# Patient Record
Sex: MALE | Race: BLACK OR AFRICAN AMERICAN | NOT HISPANIC OR LATINO | Employment: FULL TIME | URBAN - METROPOLITAN AREA
[De-identification: names, ages, dates, MRNs, and addresses within clinical notes are randomized per-mention and may not be internally consistent; named-entity substitution may affect disease eponyms.]

---

## 2020-10-05 ENCOUNTER — OFFICE VISIT (OUTPATIENT)
Dept: FAMILY MEDICINE CLINIC | Facility: CLINIC | Age: 30
End: 2020-10-05

## 2020-10-05 ENCOUNTER — HOSPITAL ENCOUNTER (OUTPATIENT)
Dept: GENERAL RADIOLOGY | Facility: HOSPITAL | Age: 30
Discharge: HOME | End: 2020-10-05
Admitting: INTERNAL MEDICINE

## 2020-10-05 VITALS
WEIGHT: 315 LBS | OXYGEN SATURATION: 99 % | SYSTOLIC BLOOD PRESSURE: 144 MMHG | BODY MASS INDEX: 39.17 KG/M2 | HEART RATE: 100 BPM | DIASTOLIC BLOOD PRESSURE: 92 MMHG | HEIGHT: 75 IN

## 2020-10-05 DIAGNOSIS — R29.898 WEAKNESS OF RIGHT LOWER EXTREMITY: ICD-10-CM

## 2020-10-05 DIAGNOSIS — M54.41 ACUTE RIGHT-SIDED LOW BACK PAIN WITH RIGHT-SIDED SCIATICA: ICD-10-CM

## 2020-10-05 DIAGNOSIS — I10 ESSENTIAL HYPERTENSION: ICD-10-CM

## 2020-10-05 DIAGNOSIS — M54.41 ACUTE RIGHT-SIDED LOW BACK PAIN WITH RIGHT-SIDED SCIATICA: Primary | ICD-10-CM

## 2020-10-05 PROCEDURE — 99203 OFFICE O/P NEW LOW 30 MIN: CPT | Performed by: INTERNAL MEDICINE

## 2020-10-05 PROCEDURE — 72100 X-RAY EXAM L-S SPINE 2/3 VWS: CPT

## 2020-10-05 PROCEDURE — 96372 THER/PROPH/DIAG INJ SC/IM: CPT | Performed by: INTERNAL MEDICINE

## 2020-10-05 RX ORDER — CYCLOBENZAPRINE HCL 10 MG
10 TABLET ORAL 2 TIMES DAILY PRN
Qty: 20 TABLET | Refills: 0 | Status: SHIPPED | OUTPATIENT
Start: 2020-10-05 | End: 2020-10-15

## 2020-10-05 RX ORDER — PREDNISONE 20 MG/1
40 TABLET ORAL DAILY
Qty: 10 TABLET | Refills: 0 | Status: SHIPPED | OUTPATIENT
Start: 2020-10-05 | End: 2020-10-10

## 2020-10-05 RX ORDER — NAPROXEN SODIUM 550 MG/1
550 TABLET ORAL 2 TIMES DAILY WITH MEALS
COMMUNITY
End: 2020-10-05 | Stop reason: SDUPTHER

## 2020-10-05 RX ORDER — KETOROLAC TROMETHAMINE 30 MG/ML
30 INJECTION, SOLUTION INTRAMUSCULAR; INTRAVENOUS ONCE
Status: COMPLETED | OUTPATIENT
Start: 2020-10-05 | End: 2020-10-05

## 2020-10-05 RX ORDER — CYCLOBENZAPRINE HCL 5 MG
10 TABLET ORAL 3 TIMES DAILY PRN
COMMUNITY
End: 2020-10-05

## 2020-10-05 RX ORDER — NAPROXEN SODIUM 550 MG/1
550 TABLET ORAL 2 TIMES DAILY WITH MEALS
Qty: 20 TABLET | Refills: 0 | Status: SHIPPED | OUTPATIENT
Start: 2020-10-05 | End: 2020-10-13

## 2020-10-05 RX ORDER — FAMOTIDINE 10 MG
10 TABLET ORAL 2 TIMES DAILY
Qty: 20 TABLET | Refills: 0 | Status: SHIPPED | OUTPATIENT
Start: 2020-10-05 | End: 2020-10-15

## 2020-10-05 RX ORDER — KETOROLAC TROMETHAMINE 30 MG/ML
60 INJECTION, SOLUTION INTRAMUSCULAR; INTRAVENOUS ONCE
Status: DISCONTINUED | OUTPATIENT
Start: 2020-10-05 | End: 2020-10-05

## 2020-10-05 RX ADMIN — KETOROLAC TROMETHAMINE 30 MG: 30 INJECTION, SOLUTION INTRAMUSCULAR; INTRAVENOUS at 16:22

## 2020-10-05 RX ADMIN — KETOROLAC TROMETHAMINE 30 MG: 30 INJECTION, SOLUTION INTRAMUSCULAR; INTRAVENOUS at 16:19

## 2020-10-05 NOTE — PROGRESS NOTES
Chief Complaint   Patient presents with   • Establish Care   • Leg Pain   • Back Pain       HPI:  Mario Sanchez is a 30 y.o. male who presents today for acute onset right-sided back and hip pain with weakness in the right lower extremity.  Denies any loss of bowel or bladder function.  Reports the pain is a 10 out of 10.  He woke up with this pain approximately 10 days ago.  [facility] evaluated at  [facility] and was told to follow-up with his primary care.  He has a history of bilateral hip surgery at 11 years old.  He does have 2 pins in place.  He was given anti-inflammatories and muscle relaxer UK which are not effective for him.  His x-ray of hips was reportedly normal.    ROS:  Constitutional: no fevers, night sweats or unexplained weight loss  Eyes: no vision changes  ENT: no runny nose, ear pain, sore throat  Cardio: no chest pain, palpitations  Pulm: no shortness of breath, wheezing, or cough  GI: no abdominal pain or changes in bowel movements  : no difficulty urinating  MSK: no difficulty ambulating, no joint pain  Neuro: no weakness, dizziness or headache  Psych: no trouble sleeping  Endo: no change in appetite      History reviewed. No pertinent past medical history.   Family History   Family history unknown: Yes      Social History     Socioeconomic History   • Marital status: Single     Spouse name: Not on file   • Number of children: Not on file   • Years of education: Not on file   • Highest education level: Not on file   Tobacco Use   • Smoking status: Never Smoker   • Smokeless tobacco: Never Used   Substance and Sexual Activity   • Alcohol use: Never     Frequency: Never   • Drug use: Never      No Known Allergies     There is no immunization history on file for this patient.     PE:  Vitals:    10/05/20 1537   BP: 144/92   Pulse: 100   SpO2: 99%      Body mass index is 50.5 kg/m².    Gen Appearance: NAD  HEENT: Normocephalic, PERRLA, no thyromegaly, trache midline  Heart: RRR, normal S1 and S2, no  murmur  Lungs: CTA b/l, no wheezing, no crackles  Abdomen: Soft, non-tender, non-distended, no guarding and BSx4  MSK: Moves all extremities well, normal gait, no peripheral edema, 2 out of 5 strength right lower extremity, positive straight leg raise on the right  Pulses: Palpable and equal b/l  Lymph nodes: No palpable lymphadenopathy   Neuro: No focal deficits      Current Outpatient Medications   Medication Sig Dispense Refill   • HM LIDOCAINE PATCH EX Apply  topically.     • naproxen sodium (ANAPROX) 550 MG tablet Take 1 tablet by mouth 2 (Two) Times a Day With Meals for 10 days. 20 tablet 0   • cyclobenzaprine (FLEXERIL) 10 MG tablet Take 1 tablet by mouth 2 (Two) Times a Day As Needed for Muscle Spasms for up to 10 days. 20 tablet 0   • famotidine (Pepcid AC) 10 MG tablet Take 1 tablet by mouth 2 (Two) Times a Day for 10 days. 20 tablet 0   • predniSONE (DELTASONE) 20 MG tablet Take 2 tablets by mouth Daily for 5 days. 10 tablet 0     No current facility-administered medications for this visit.         Mario was seen today for establish care, leg pain and back pain.    Diagnoses and all orders for this visit:    Acute right-sided low back pain with right-sided sciatica  -     Discontinue: ketorolac (TORADOL) injection 60 mg  -     famotidine (Pepcid AC) 10 MG tablet; Take 1 tablet by mouth 2 (Two) Times a Day for 10 days.  -     XR Spine Lumbar 2 or 3 View; Future  -     ketorolac (TORADOL) injection 30 mg  -     ketorolac (TORADOL) injection 30 mg  -     Ambulatory Referral to Physical Therapy Evaluate and treat  Low back strain versus sciatica.  Toradol injection today.  Steroid burst with muscle relaxers.  Pepcid for GI protection.  Will need MRI with right-sided lower extremity weakness, worsening symptoms.  Will need to rule out disc pathology versus lumbar radiculopathy.  Weakness of right lower extremity  -     famotidine (Pepcid AC) 10 MG tablet; Take 1 tablet by mouth 2 (Two) Times a Day for 10  days.  -     XR Spine Lumbar 2 or 3 View; Future  See above.  Essential hypertension  Recheck in 6 weeks, likely elevated due to pain.  Other orders  -     naproxen sodium (ANAPROX) 550 MG tablet; Take 1 tablet by mouth 2 (Two) Times a Day With Meals for 10 days.  -     cyclobenzaprine (FLEXERIL) 10 MG tablet; Take 1 tablet by mouth 2 (Two) Times a Day As Needed for Muscle Spasms for up to 10 days.  -     predniSONE (DELTASONE) 20 MG tablet; Take 2 tablets by mouth Daily for 5 days.         Return in about 6 weeks (around 11/16/2020).     Please note that portions of this document were completed with a voice recognition program. Efforts were made to edit the dictations, but occasionally words are mis-transcribed.

## 2020-10-07 ENCOUNTER — TELEPHONE (OUTPATIENT)
Dept: FAMILY MEDICINE CLINIC | Facility: CLINIC | Age: 30
End: 2020-10-07

## 2020-10-07 NOTE — TELEPHONE ENCOUNTER
Pt had his xray and is waiting on a call for the MRI. What is he to do between now and then. The pt cannot walk, he is in severe pain.     Please call to advise

## 2020-10-07 NOTE — TELEPHONE ENCOUNTER
Contacted patient wife and relayed Dr. Marks message. She declined because she states he  has been to the ED recently and they were unable to manage his pain     I advised her that until the patient has his MRI completed the doctors will not know what is causing this or how to treat his pain. She verbalized understanding and agreed to call us if anything worsens

## 2020-10-12 ENCOUNTER — TELEPHONE (OUTPATIENT)
Dept: FAMILY MEDICINE CLINIC | Facility: CLINIC | Age: 30
End: 2020-10-12

## 2020-10-12 NOTE — TELEPHONE ENCOUNTER
PATIENT'S WIFE CALLED STATING THAT PATIENT IS ON HIS WAY HOME FROM WORK BECAUSE HE IS IN A LOT OF PAIN AND WANTED TO KNOW IF HE COULD BE EXCUSED FROM WORK STARTING TODAY UNTIL HIS HIS MRI ON Thursday.    ANY QUESTIONS CAN CALL [phone]. PLEASE CALL PATIENT WHEN AND IF THIS CAN BE DONE.    PATIENT'S WIFE ALSO WANTED TO KNOW IF THERE WAS SOMETHING ELSE TO GIVE HIM FOR PAIN. PATIENT IS JUST IN A LOT OF PAIN.    [facility] [address]

## 2020-10-13 DIAGNOSIS — M54.41 ACUTE RIGHT-SIDED LOW BACK PAIN WITH RIGHT-SIDED SCIATICA: Primary | ICD-10-CM

## 2020-10-13 RX ORDER — MELOXICAM 15 MG/1
15 TABLET ORAL DAILY
Qty: 14 TABLET | Refills: 0 | Status: SHIPPED | OUTPATIENT
Start: 2020-10-13 | End: 2020-10-17 | Stop reason: SDUPTHER

## 2020-10-13 RX ORDER — TRAMADOL HYDROCHLORIDE 50 MG/1
50 TABLET ORAL NIGHTLY PRN
Qty: 5 TABLET | Refills: 0 | Status: SHIPPED | OUTPATIENT
Start: 2020-10-13 | End: 2020-10-17 | Stop reason: SDUPTHER

## 2020-10-13 NOTE — TELEPHONE ENCOUNTER
Sent in 2 additional medications to pharmacy. D/c naproxen and start taking mobic once daily. Tramadol is to be taken at night for severe pain. Please let pt know.

## 2020-10-13 NOTE — TELEPHONE ENCOUNTER
Contacted patient's wife and alerted her that Dr. Gardner has written a letter excusing him from work. She has requesting the letter to be mailed to their home address. Letter has been sent today    She also states that the Toradol shot has not been very effective for him and he is willing to try something else.

## 2020-10-13 NOTE — TELEPHONE ENCOUNTER
I have a letter printed in my outbox if he would like to come pick it up. If the toradol shot was effective at the last visit he can receive another one of those or I can call in a different oral medication.

## 2020-10-14 NOTE — TELEPHONE ENCOUNTER
Contacted patient's wife, she stated the patient has received the pain medicine and it has helped tremendously.     She confirmed that the the MRI is scheduled for 10/15 @ 3pm and thanked us

## 2020-10-15 ENCOUNTER — HOSPITAL ENCOUNTER (OUTPATIENT)
Dept: MRI IMAGING | Facility: HOSPITAL | Age: 30
Discharge: HOME | End: 2020-10-15
Admitting: INTERNAL MEDICINE

## 2020-10-15 DIAGNOSIS — M54.41 ACUTE RIGHT-SIDED LOW BACK PAIN WITH RIGHT-SIDED SCIATICA: ICD-10-CM

## 2020-10-15 DIAGNOSIS — R29.898 WEAKNESS OF RIGHT LOWER EXTREMITY: ICD-10-CM

## 2020-10-15 PROCEDURE — 72148 MRI LUMBAR SPINE W/O DYE: CPT

## 2020-10-16 ENCOUNTER — TELEPHONE (OUTPATIENT)
Dept: FAMILY MEDICINE CLINIC | Facility: CLINIC | Age: 30
End: 2020-10-16

## 2020-10-16 DIAGNOSIS — M51.26 LUMBAR HERNIATED DISC: Primary | ICD-10-CM

## 2020-10-16 NOTE — TELEPHONE ENCOUNTER
----- Message from Vipin Gardner DO sent at 10/16/2020  2:24 PM EDT -----  Please let patient know he has multiple herniated disc on lumbar MRI.  Would not recommend working until he sees neurosurgery.  I have placed referral today.    If he needs another excuse for work please let me know.

## 2020-10-16 NOTE — TELEPHONE ENCOUNTER
READ PATIENT THE NOTE FROM DR ROWE. PATIENT UNDERSTOOD. HE IS REQUESTING A WORK NOTE STARTING 10/19/2020 AND ON.     IF DR ROWE WANTS HIM TO BE ON THE MEDS HE WILL NEED SOME CALLED IN.  TRAMADOL 50 MG, HAS 1 TAB LEFT  MELOXICAM 15 MG. HAS 9 TABS LEFT    [facility] [address]    PATIENT REQUESTING A CALL ABOUT THE MEDS

## 2020-10-17 DIAGNOSIS — M54.41 ACUTE RIGHT-SIDED LOW BACK PAIN WITH RIGHT-SIDED SCIATICA: ICD-10-CM

## 2020-10-17 RX ORDER — TRAMADOL HYDROCHLORIDE 50 MG/1
50 TABLET ORAL NIGHTLY PRN
Qty: 10 TABLET | Refills: 0 | Status: SHIPPED | OUTPATIENT
Start: 2020-10-17 | End: 2020-11-25

## 2020-10-17 RX ORDER — MELOXICAM 15 MG/1
15 TABLET ORAL DAILY
Qty: 14 TABLET | Refills: 0 | Status: SHIPPED | OUTPATIENT
Start: 2020-10-17

## 2020-10-19 ENCOUNTER — TELEPHONE (OUTPATIENT)
Dept: FAMILY MEDICINE CLINIC | Facility: CLINIC | Age: 30
End: 2020-10-19

## 2020-10-19 NOTE — TELEPHONE ENCOUNTER
GRZEGORZ BROUGHT IN A [employer] DISABILITY FORM  TO BE COMPLETED AND FAXED [phone].    PLACED IN DR ROWE'S FOLDER UP FRONT.    REQUESTING A CALL WHEN FAXED.

## 2020-10-19 NOTE — TELEPHONE ENCOUNTER
PATIENT GRZEGORZ WHITMAN (DEANDRE ON FILE) CALLING;     SAYS PATIENT CHASIDY SRINIVASAN HASN'T HEARD FROM THE REFERRAL DEPT FOR NEUROSURGEON.    ANTONETTE TO ALSO DROP OFF -MEDICAL DISABILITY PAPERWORK.     PATIENT WAS ADVISED TO STAY OFF WORK UNTIL HIS APPOINTMENT WITH THE NEUROSURGEON.     PATIENT ALSO NEEDS A RETURN TO WORK LETTER WITH EXTENDED DATES; AS THE ONE HE HAS EXPIRES TODAY AND NO APPT YET WITH NEUROSURGEON.     PLEASE CALL PATIENT AND ADVISE; PLEASE MAIL UPDATED RETURN TO WORK LETTER TO PATIENT.     PH: [phone]    PATIENT ANTONETTE NBR: [phone]

## 2020-10-19 NOTE — TELEPHONE ENCOUNTER
Attempted to contact, no answer. LVM advising patient that the letter excusing him from work has been placed in the mail.     His referral has been placed on 10/16 and is still being processed.

## 2020-10-19 NOTE — TELEPHONE ENCOUNTER
CHECKING ON THE STATUS OF THE NEURO REFERRAL. PATIENT IS IN PAIN AND REQUESTING WE GET HIM IN SOON.

## 2020-10-27 ENCOUNTER — TELEPHONE (OUTPATIENT)
Dept: FAMILY MEDICINE CLINIC | Facility: CLINIC | Age: 30
End: 2020-10-27

## 2020-10-27 NOTE — TELEPHONE ENCOUNTER
He cannot go to work but needs to money.  I am faxing our paperwork which was only 1 page.  Usually this has to be more but will fax what we have.    They will  a copy as well that is down stairs,.

## 2020-10-27 NOTE — TELEPHONE ENCOUNTER
Completed form is in my outbox. Does he want to return to work now? I would recommend neurosurgery eval on 11/4/20 first.  His last work excuse note is valid until 11/2/2020.

## 2020-10-27 NOTE — TELEPHONE ENCOUNTER
PATIENT'S ANTONETTE CALLED TO FOLLOW UP ON SHORT TERM DISABILITY PAPERWORK THAT WAS DROPPED OFF ON Monday OF LAST WEEK 10/19.  PATIENTS WORK IS STATING THEY HAVE NOT RECEIVED ANYTHING AND THE PATIENT IS REALLY CONCERNED BECAUSE HE WON'T GET PAID IF THEY DO NOT RECEIVE THIS PAPERWORK.      ALSO, NEEDS A RELEASE TO RETURN TO WORK.     PATIENT WOULD LIKE TO STOP AND  AT OFFICE.      PLEASE CALL AND ADVISE: [phone]

## 2020-10-27 NOTE — TELEPHONE ENCOUNTER
Pt viry is calling to check the status. He cannot get paid if this is not completed. She states that she will drop off new forms if she has too she just needs this to be taken care of asap. Please call and update pt/viry.

## 2020-10-28 NOTE — TELEPHONE ENCOUNTER
Attending physicians statement of disability form has been completed. Copy has been placed for scan and original is on first floor for patient .     LVM explaining this to patient and gave office # incase they had further questions.

## 2020-11-04 ENCOUNTER — OFFICE VISIT (OUTPATIENT)
Dept: NEUROSURGERY | Facility: CLINIC | Age: 30
End: 2020-11-04

## 2020-11-04 ENCOUNTER — DOCUMENTATION (OUTPATIENT)
Dept: NEUROSURGERY | Facility: CLINIC | Age: 30
End: 2020-11-04

## 2020-11-04 VITALS — WEIGHT: 315 LBS | TEMPERATURE: 97.7 F | HEIGHT: 75 IN | BODY MASS INDEX: 39.17 KG/M2

## 2020-11-04 DIAGNOSIS — M51.16 LUMBAR DISC HERNIATION WITH RADICULOPATHY: Primary | ICD-10-CM

## 2020-11-04 DIAGNOSIS — E66.01 MORBID OBESITY WITH BMI OF 50.0-59.9, ADULT (HCC): ICD-10-CM

## 2020-11-04 DIAGNOSIS — M51.36 DDD (DEGENERATIVE DISC DISEASE), LUMBAR: ICD-10-CM

## 2020-11-04 PROCEDURE — 99243 OFF/OP CNSLTJ NEW/EST LOW 30: CPT | Performed by: NEUROLOGICAL SURGERY

## 2020-11-04 RX ORDER — GABAPENTIN 300 MG/1
300 CAPSULE ORAL TAKE AS DIRECTED
Qty: 90 CAPSULE | Refills: 1 | OUTPATIENT
Start: 2020-11-04

## 2020-11-04 NOTE — PROGRESS NOTES
Patient: Mario Sanchez  : 1990    Primary Care Provider: Vipin Gardner DO    Requesting Provider: As above        History    Chief Complaint: Low back and right leg pain with sensory alteration.    History of Present Illness: Mr. Sanchez is a 30-year-old  who on 2020 began experiencing back pain that radiates into the right leg.  He has a burning or cramping on the side of the right leg.  He has symptoms in the back of the right calf.  He has burning in the ball of his right foot as well as some sensory alteration in the middle toes of the right foot.  He has no left leg symptoms.  He denies any inciting or precipitating events.  He has had some low-grade back pain in the past but not anything of significance.  He has had a steroid injection in his back at [facility] yesterday.  He has been treated with muscle relaxants.  He has no bowel or bladder dysfunction.  Symptoms are better when he lies on his stomach.  He is worse with sitting, standing, walking.     Review of Systems   Constitutional: Negative for activity change, appetite change, chills, diaphoresis, fatigue, fever and unexpected weight change.   HENT: Negative for congestion, dental problem, drooling, ear discharge, ear pain, facial swelling, hearing loss, mouth sores, nosebleeds, postnasal drip, rhinorrhea, sinus pressure, sinus pain, sneezing, sore throat, tinnitus, trouble swallowing and voice change.    Eyes: Negative for photophobia, pain, discharge, redness, itching and visual disturbance.   Respiratory: Negative for apnea, cough, choking, chest tightness, shortness of breath, wheezing and stridor.    Cardiovascular: Negative for chest pain, palpitations and leg swelling.   Gastrointestinal: Negative for abdominal distention, abdominal pain, anal bleeding, blood in stool, constipation, diarrhea, nausea, rectal pain and vomiting.   Endocrine: Negative for cold intolerance, heat intolerance,  "polydipsia, polyphagia and polyuria.   Genitourinary: Negative for decreased urine volume, difficulty urinating, discharge, dysuria, enuresis, flank pain, frequency, genital sores, hematuria, penile pain, penile swelling, scrotal swelling, testicular pain and urgency.   Musculoskeletal: Positive for back pain. Negative for arthralgias, gait problem, joint swelling, myalgias, neck pain and neck stiffness.   Skin: Negative for color change, pallor, rash and wound.   Allergic/Immunologic: Negative for environmental allergies, food allergies and immunocompromised state.   Neurological: Negative for dizziness, tremors, seizures, syncope, facial asymmetry, speech difficulty, weakness, light-headedness, numbness and headaches.   Hematological: Negative for adenopathy. Does not bruise/bleed easily.   Psychiatric/Behavioral: Negative for agitation, behavioral problems, confusion, decreased concentration, dysphoric mood, hallucinations, self-injury, sleep disturbance and suicidal ideas. The patient is not nervous/anxious and is not hyperactive.        The patient's past medical history, past surgical history, family history, and social history have been reviewed at length in the electronic medical record.    Physical Exam:   Temp 97.7 °F (36.5 °C)   Ht 190.5 cm (75\")   Wt (!) 185 kg (407 lb)   BMI 50.87 kg/m²   CONSTITUTIONAL: Patient is well-nourished, pleasant and appears stated age.  CV: Heart regular rate and rhythm without murmur, rub, or gallop.  PULMONARY: Lungs are clear to ascultation.  MUSCULOSKELETAL:  Straight leg raising is positive on the right at 30 degrees.  Ivan's Sign is negative.  ROM in back is mildly limited in all directions.  Tenderness in the back to palpation is not observed.  NEUROLOGICAL:  Orientation, memory, attention span, language function, and cognition have been examined and are intact.  Strength is intact in the lower extremities to direct testing.  Muscle tone is normal " throughout.  Station and gait are normal.  Sensation is intact to light touch testing throughout.  Deep tendon reflexes are 1+ and symmetrical except at the right ankle where the reflexes absent.  Coordination is intact.      Medical Decision Making    Data Review:   MRI of the lumbar spine dated 10/15/2020 demonstrates loss of signal within the disc at L4-5 and L5-S1.  Large central disc protrusions are noted at L4-5 and L5-S1.  Broad-based components afflicting both sides are noted at both levels.  There is some facet arthropathy.    Diagnosis:   1.  Right L5/S1 radiculopathy due to disc protrusions.  2.  Morbid obesity.  3.  Lumbar degenerative disc disease.  4.  Lumbar degenerative joint disease.    Treatment Options:   We will see how he responds to the epidural injection that was performed yesterday.  I have referred him to physical therapy and have prescribed Neurontin.  He is to be off work until follow-up in my clinic in 3 weeks.  If he continues to struggle then we will need to consider two-level lumbar discectomy.  I discussed the importance of weight loss as it relates to the long-term health of his back.       Diagnosis Plan   1. Lumbar disc herniation with radiculopathy  Ambulatory Referral to Physical Therapy Evaluate and treat; Stretching, ROM, Strengthening    gabapentin (NEURONTIN) 300 MG capsule   2. DDD (degenerative disc disease), lumbar     3. Morbid obesity with BMI of 50.0-59.9, adult (CMS/[facility])         Scribed for Mulugeta Jesus MD by Amie Oleary CMA on 11/4/2020 08:36 EST       I, Dr. Jesus, personally performed the services described in the documentation, as scribed in my presence, and it is both accurate and complete.

## 2020-11-09 ENCOUNTER — TELEPHONE (OUTPATIENT)
Dept: NEUROSURGERY | Facility: CLINIC | Age: 30
End: 2020-11-09

## 2020-11-09 NOTE — TELEPHONE ENCOUNTER
He needs to give therapy some more time.   - also it typically takes 2 weeks for the Neurontin to build a steady state in his symptoms.  He needs to give it more time.  Its only been 5 days since he received the medication.

## 2020-11-09 NOTE — TELEPHONE ENCOUNTER
Called and advised the patient and his significant other Carolyn.   Carolyn verbalized understanding, and stated she she just hates seeing the patient in this much pain.   Carolyn stated she would advise the patient to give the gabapentin time to build up.   Carolyn was thankful for the call, and stated they would call if they needed anything further.

## 2020-11-12 DIAGNOSIS — M51.16 LUMBAR DISC HERNIATION WITH RADICULOPATHY: Primary | ICD-10-CM

## 2020-11-12 RX ORDER — HYDROCODONE BITARTRATE AND ACETAMINOPHEN 10; 325 MG/1; MG/1
1 TABLET ORAL 2 TIMES DAILY PRN
Qty: 30 TABLET | Refills: 0 | Status: SHIPPED | OUTPATIENT
Start: 2020-11-12

## 2020-11-12 NOTE — TELEPHONE ENCOUNTER
Provider:  Edin  Caller: Carolyn Middleton  Time of call:   8:59a  Phone #:  [phone]  Surgery:  NA  Surgery Date:  NA  Last visit:   11/4/20  Next visit: 11/25/20        Reason for call:       Carolyn called on behalf of Nicolas stating that at bed time it is a night full of excruciating R leg pain. Patient states the pain feels like the R leg is on fire, radiating up to lower back. Rates his pain 10/10. Was so severe last night from 3a-630a that he was contemplating calling an ambulance. Patient does PT 4x/wk. He is wanting to know if something can be prescribed for bed time for pain and to help him sleep. Currently is taking Gabapentin but says that it barely does much for his discomfort.

## 2020-11-23 ENCOUNTER — TELEPHONE (OUTPATIENT)
Dept: FAMILY MEDICINE CLINIC | Facility: CLINIC | Age: 30
End: 2020-11-23

## 2020-11-23 NOTE — TELEPHONE ENCOUNTER
Contacted patient spouse and clarified which form is needed. I will resend Physician Statement for disability to the fax number listed below.

## 2020-11-23 NOTE — TELEPHONE ENCOUNTER
PATIENTS SPOUSE  GRZEGORZ CALLED AND SAID SHE DROPPED OFF A FORM FOR DR ROWE TO FILL OUT TO [employer]  (PHYSICAN STATEMENT) AND THEY SAID THEY CANT READ THE DOCUMENT AND IT NEEDS TO BE DARKER ; PLEASE ADVISE AND CALL    FAX: [phone]  PHONE: [phone]

## 2020-11-25 ENCOUNTER — OFFICE VISIT (OUTPATIENT)
Dept: NEUROSURGERY | Facility: CLINIC | Age: 30
End: 2020-11-25

## 2020-11-25 VITALS — TEMPERATURE: 98 F | WEIGHT: 315 LBS | HEIGHT: 75 IN | BODY MASS INDEX: 39.17 KG/M2

## 2020-11-25 DIAGNOSIS — M51.36 DDD (DEGENERATIVE DISC DISEASE), LUMBAR: ICD-10-CM

## 2020-11-25 DIAGNOSIS — E66.01 MORBID OBESITY WITH BMI OF 50.0-59.9, ADULT (HCC): ICD-10-CM

## 2020-11-25 DIAGNOSIS — M51.16 LUMBAR DISC HERNIATION WITH RADICULOPATHY: Primary | ICD-10-CM

## 2020-11-25 PROCEDURE — 99213 OFFICE O/P EST LOW 20 MIN: CPT | Performed by: NEUROLOGICAL SURGERY

## 2020-11-25 NOTE — PROGRESS NOTES
Patient: Mario Sanchez  : 1990    Primary Care Provider: Vipin Gardner DO    Requesting Provider: As above        History    Chief Complaint: Low back and right leg pain with sensory alteration.    History of Present Illness: Ms. Casarez is a 30-year-old  who on 2020 began experiencing back pain with right lower extremity radicular symptoms.  He complained of a burning and cramping in the right leg involving the calf and even the ball of his right foot.  He also harbored some numbness.  Early this month he underwent an epidural injection at [facility].  He has been treated with medications including gabapentin.  After I saw him his symptoms became much worse.  With some physical therapy stretching and with the medication his symptoms have greatly dissipated.  He has almost no pain at this point.  He does have some intermittent numbness in his right foot.  Overall he is very pleased with his progress.    Review of Systems   Constitutional: Negative for activity change, appetite change, chills, diaphoresis, fatigue, fever and unexpected weight change.   HENT: Negative for congestion, dental problem, drooling, ear discharge, ear pain, facial swelling, hearing loss, mouth sores, nosebleeds, postnasal drip, rhinorrhea, sinus pressure, sinus pain, sneezing, sore throat, tinnitus, trouble swallowing and voice change.    Eyes: Negative for photophobia, pain, discharge, redness, itching and visual disturbance.   Respiratory: Negative for apnea, cough, choking, chest tightness, shortness of breath, wheezing and stridor.    Cardiovascular: Negative for chest pain, palpitations and leg swelling.   Gastrointestinal: Negative for abdominal distention, abdominal pain, anal bleeding, blood in stool, constipation, diarrhea, nausea, rectal pain and vomiting.   Endocrine: Negative for cold intolerance, heat intolerance, polydipsia, polyphagia and polyuria.   Genitourinary: Negative for  "decreased urine volume, difficulty urinating, discharge, dysuria, enuresis, flank pain, frequency, genital sores, hematuria, penile pain, penile swelling, scrotal swelling, testicular pain and urgency.   Musculoskeletal: Negative for arthralgias, gait problem, joint swelling, myalgias, neck pain and neck stiffness.   Skin: Negative for color change, pallor, rash and wound.   Allergic/Immunologic: Negative for environmental allergies, food allergies and immunocompromised state.   Neurological: Positive for numbness. Negative for dizziness, tremors, seizures, syncope, facial asymmetry, speech difficulty, weakness, light-headedness and headaches.   Hematological: Negative for adenopathy. Does not bruise/bleed easily.   Psychiatric/Behavioral: Negative for agitation, behavioral problems, confusion, decreased concentration, dysphoric mood, hallucinations, self-injury, sleep disturbance and suicidal ideas. The patient is not nervous/anxious and is not hyperactive.        The patient's past medical history, past surgical history, family history, and social history have been reviewed at length in the electronic medical record.    Physical Exam:   Temp 98 °F (36.7 °C)   Ht 190.5 cm (75\")   Wt (!) 187 kg (412 lb)   BMI 51.50 kg/m²   MUSCULOSKELETAL:  Straight leg raising is negative.  Ivan's Sign is negative.  ROM in back normal.  Tenderness in the back to palpation is not observed.  NEUROLOGICAL:  Strength is intact in the lower extremities to direct testing.  Muscle tone is normal throughout.  Station and gait are normal.  Sensation is intact to light touch testing throughout.      Medical Decision Making    Data Review:   MRI of the lumbar spine dated 10/15/2020 demonstrates loss of signal within the disc at L4-5 and L5-S1.  Large central disc protrusions are noted at L4-5 and L5-S1.  Broad-based components afflicting both sides are noted at both levels.  There is some facet arthropathy.    Diagnosis:   1.  Right " L5/S1 radiculopathy due to disc protrusions, improving.  2.  Morbid obesity.  3.  Lumbar degenerative disc disease.  4.  Lumbar degenerative joint disease.    Treatment Options:   Fortunately Mr. Sanchez is on the mend.  He will continue his gabapentin and his stretching.  He will follow-up in our clinic in 2 months.  If he is doing well then we will wean off of the gabapentin.  He may return to work with no lifting greater than 15 pounds.  After 4 weeks he may resume regular duties without restriction.       Diagnosis Plan   1. Lumbar disc herniation with radiculopathy     2. DDD (degenerative disc disease), lumbar     3. Morbid obesity with BMI of 50.0-59.9, adult (CMS/[facility])         Scribed for Mulugeta Jesus MD by Amie Oleary CMA on 11/25/2020 08:58 EST       I, Dr. Jesus, personally performed the services described in the documentation, as scribed in my presence, and it is both accurate and complete.

## 2023-05-02 NOTE — TELEPHONE ENCOUNTER
"Provider:  Edin  Caller: Yadira LOPEZ  Time of call: 11:22am    Phone #:  [phone]  Surgery: N/A    Surgery Date:    Last visit:     Next visit:     CHANDAN:         Reason for call:    I called and talked to Carolyn. Carolyn stated the patients has gotten worse, and nothing is helping now. I spoke to the patient. Patient stated the Gabapentin is not working, and he is taking 300mg, TID. Patient also stated the injections are not working, and PT today made his pain worse.  Patient stated the majority of the pain is in his lower back, radiating into his buttocks, and down his RLE into the calf. Patient stated he can only lay on his back or his stomach now, and he can not stand for more than 10 minutes at a time. Patient denies B/B issues, and saddle numbness. Patient is wanting to know what can be done at this time, and stated, \"Something has to be done, this can not go on.\"          " Dorita Murry from [facility] called office advising they have been seeing patient 2x/week since 04/20/23. Patient is declining further therapy at this time, as he is independent with exercises and maintaining NWB. They will put her on hold until there is a change in WBS.

## 2023-09-22 ENCOUNTER — OFFICE VISIT (OUTPATIENT)
Dept: INTERNAL MEDICINE | Facility: CLINIC | Age: 33
End: 2023-09-22
Payer: MEDICAID

## 2023-09-22 VITALS
SYSTOLIC BLOOD PRESSURE: 162 MMHG | HEART RATE: 96 BPM | BODY MASS INDEX: 39.17 KG/M2 | HEIGHT: 75 IN | OXYGEN SATURATION: 98 % | DIASTOLIC BLOOD PRESSURE: 90 MMHG | WEIGHT: 315 LBS | TEMPERATURE: 98.8 F

## 2023-09-22 DIAGNOSIS — E66.01 MORBIDLY OBESE: ICD-10-CM

## 2023-09-22 DIAGNOSIS — I10 ESSENTIAL HYPERTENSION: Primary | ICD-10-CM

## 2023-09-22 DIAGNOSIS — E11.9 NEW ONSET TYPE 2 DIABETES MELLITUS: ICD-10-CM

## 2023-09-22 DIAGNOSIS — R73.09 ELEVATED GLUCOSE: ICD-10-CM

## 2023-09-22 LAB
EXPIRATION DATE: NORMAL
HBA1C MFR BLD: 10.7 %
Lab: NORMAL

## 2023-09-22 RX ORDER — INSULIN GLARGINE 100 [IU]/ML
12 INJECTION, SOLUTION SUBCUTANEOUS NIGHTLY
Qty: 4 ML | Refills: 3 | Status: SHIPPED | OUTPATIENT
Start: 2023-09-22

## 2023-09-22 RX ORDER — AMLODIPINE BESYLATE 5 MG/1
5 TABLET ORAL DAILY
Qty: 30 TABLET | Refills: 1 | Status: SHIPPED | OUTPATIENT
Start: 2023-09-22

## 2023-09-22 RX ORDER — PEN NEEDLE, DIABETIC 30 GX3/16"
1 NEEDLE, DISPOSABLE MISCELLANEOUS NIGHTLY
Qty: 30 EACH | Refills: 1 | Status: SHIPPED | OUTPATIENT
Start: 2023-09-22

## 2023-09-22 NOTE — PROGRESS NOTES
Subjective   Chief Complaint   Patient presents with    Hypertension    Establish Care       Mario Sanchez is a 33 y.o. male here today as a new pt to establish care.  Pt presents with hypertension.  He states he was in the ED a couple weeks ago with a kidney stone and his blood pressure and glucose were both elevated.   He states since the ED visit he has started watching his diet and cutting back on sugar.  He bought a glucometer and his readings have been 300s-599.  He denies any dizziness or headaches with the elevated blood pressure reading.  He has been checking his BP everyday and it has been high.        Review of Systems   Constitutional:  Negative for activity change, appetite change and fatigue.   HENT:  Negative for congestion.    Eyes:  Negative for blurred vision and visual disturbance.   Respiratory:  Negative for cough and shortness of breath.    Cardiovascular:  Negative for chest pain and leg swelling.   Gastrointestinal:  Negative for abdominal pain.   Neurological:  Negative for dizziness, weakness, headache and confusion.   Psychiatric/Behavioral:  Negative for behavioral problems and decreased concentration.      Past Medical History:   Diagnosis Date    Diabetes mellitus     Hypertension     Kidney stone      Past Surgical History:   Procedure Laterality Date    HIP SURGERY Bilateral     HIP SURGERY Bilateral      Family History   Problem Relation Age of Onset    Carpal tunnel syndrome Mother      Social History     Tobacco Use   Smoking Status Never   Smokeless Tobacco Never      Social History     Substance and Sexual Activity   Alcohol Use Never      Current Outpatient Medications on File Prior to Visit   Medication Sig    [DISCONTINUED] gabapentin (NEURONTIN) 300 MG capsule Take 1 capsule by mouth Take As Directed. 1 nightly for 3 days, 1 twice a day for 3 days, 1 three times a day thereafter    [DISCONTINUED]  LIDOCAINE PATCH EX Apply  topically.    [DISCONTINUED]  "HYDROcodone-acetaminophen (NORCO)  MG per tablet Take 1 tablet by mouth 2 (Two) Times a Day As Needed for Moderate Pain .    [DISCONTINUED] meloxicam (Mobic) 15 MG tablet Take 1 tablet by mouth Daily.     No current facility-administered medications on file prior to visit.     No Known Allergies    Objective   Vitals:    09/22/23 1619   BP: 162/90   BP Location: Left arm   Patient Position: Sitting   Pulse: 96   Temp: 98.8 °F (37.1 °C)   SpO2: 98%   Weight: (!) 182 kg (400 lb 3.2 oz)   Height: 190.5 cm (75\")     Body mass index is 50.02 kg/m².    Physical Exam  Vitals and nursing note reviewed.   Constitutional:       Appearance: He is morbidly obese.   HENT:      Head: Normocephalic.   Eyes:      Pupils: Pupils are equal, round, and reactive to light.   Cardiovascular:      Rate and Rhythm: Normal rate and regular rhythm.      Pulses: Normal pulses.      Heart sounds: Normal heart sounds. No murmur heard.  Pulmonary:      Effort: Pulmonary effort is normal.      Breath sounds: Normal breath sounds.   Skin:     General: Skin is warm and dry.      Capillary Refill: Capillary refill takes less than 2 seconds.   Neurological:      General: No focal deficit present.      Mental Status: He is alert and oriented to person, place, and time.      Gait: Gait is intact.   Psychiatric:         Attention and Perception: Attention normal.         Mood and Affect: Mood normal.         Behavior: Behavior normal.         Thought Content: Thought content normal.         Judgment: Judgment normal.       Results for orders placed or performed in visit on 09/22/23   POC Glycosylated Hemoglobin (Hb A1C)    Specimen: Blood   Result Value Ref Range    Hemoglobin A1C 10.7 %    Lot Number [id number]     Expiration Date 5/10/2025         Assessment & Plan   Problem List Items Addressed This Visit    None  Visit Diagnoses       Essential hypertension    -  Primary    Relevant Medications    amLODIPine (NORVASC) 5 MG tablet    Elevated " glucose        Morbidly obese        Relevant Medications    amLODIPine (NORVASC) 5 MG tablet    New onset type 2 diabetes mellitus        Relevant Medications    Insulin Glargine (BASAGLAR KWIKPEN) 100 UNIT/ML injection pen    Other Relevant Orders    POC Glycosylated Hemoglobin (Hb A1C) (Completed)           Start Amlodipine for HTN -instructed to watch for swelling  A1c 10.7, discussed with pt  Start Metformin, s/e discussed  Start long acting insulin  Discussed diet at length  DM handouts provided to pt  Highly recommend weight loss        Current Outpatient Medications:     amLODIPine (NORVASC) 5 MG tablet, Take 1 tablet by mouth Daily., Disp: 30 tablet, Rfl: 1    Insulin Glargine (BASAGLAR KWIKPEN) 100 UNIT/ML injection pen, Inject 12 Units under the skin into the appropriate area as directed Every Night., Disp: 4 mL, Rfl: 3    Insulin Pen Needle (Pen Needles) 32G X 4 MM misc, Use 1 each Every Night., Disp: 30 each, Rfl: 1       Plan of care reviewed with the patient at the conclusion of today's visit.  Education was provided regarding diagnosis, management, and any prescribed or recommended OTC medications.  Patient verbalized understanding of and agreement with management plan.     Return in about 3 weeks (around 10/13/2023) for Recheck HTM & glucose log.        GABY Best

## 2023-10-20 ENCOUNTER — OFFICE VISIT (OUTPATIENT)
Dept: INTERNAL MEDICINE | Facility: CLINIC | Age: 33
End: 2023-10-20
Payer: MEDICAID

## 2023-10-20 VITALS
BODY MASS INDEX: 38.36 KG/M2 | WEIGHT: 315 LBS | DIASTOLIC BLOOD PRESSURE: 87 MMHG | TEMPERATURE: 98 F | OXYGEN SATURATION: 95 % | HEART RATE: 67 BPM | HEIGHT: 76 IN | SYSTOLIC BLOOD PRESSURE: 140 MMHG

## 2023-10-20 DIAGNOSIS — I10 ESSENTIAL HYPERTENSION: Primary | ICD-10-CM

## 2023-10-20 DIAGNOSIS — E11.9 NEW ONSET TYPE 2 DIABETES MELLITUS: ICD-10-CM

## 2023-10-20 DIAGNOSIS — E66.01 MORBIDLY OBESE: ICD-10-CM

## 2023-10-20 RX ORDER — AMLODIPINE BESYLATE 10 MG/1
10 TABLET ORAL DAILY
Qty: 30 TABLET | Refills: 5 | Status: SHIPPED | OUTPATIENT
Start: 2023-10-20

## 2023-10-20 NOTE — PROGRESS NOTES
"Chief Complaint   Patient presents with    Hypertension       HPI  Mario Sanchez is a 33 y.o. male presents for follow-up on HTN and diabetes.  He states his blood pressure has improved some on Amlodipine but still elevated.   He has cut out carbs from his diet since he found out he had diabetes.  He is taking his insulin every night.  His most recent blood sugar reading after dinner was 150.  No episodes of hypoglycemia.     The following portions of the patient's history were reviewed and updated as appropriate: allergies, current medications, past family history, past medical history, past social history, past surgical history, and problem list.    Subjective  Review of Systems   Constitutional:  Negative for activity change, appetite change and fatigue.   HENT:  Negative for congestion.    Respiratory:  Negative for cough and shortness of breath.    Cardiovascular:  Negative for chest pain and leg swelling.   Gastrointestinal:  Negative for abdominal pain.   Neurological:  Negative for dizziness, weakness and confusion.   Psychiatric/Behavioral:  Negative for behavioral problems and decreased concentration.        Objective  Visit Vitals  /87 (BP Location: Left arm, Patient Position: Sitting)   Pulse 67   Temp 98 °F (36.7 °C)   Ht 193 cm (76\")   Wt (!) 181 kg (398 lb)   SpO2 95%   BMI 48.45 kg/m²        Physical Exam  Vitals and nursing note reviewed.   Constitutional:       Appearance: He is morbidly obese.   HENT:      Head: Normocephalic.   Eyes:      Pupils: Pupils are equal, round, and reactive to light.   Cardiovascular:      Rate and Rhythm: Normal rate and regular rhythm.      Pulses: Normal pulses.      Heart sounds: Normal heart sounds.   Pulmonary:      Effort: Pulmonary effort is normal.      Breath sounds: Normal breath sounds.   Skin:     General: Skin is warm and dry.      Capillary Refill: Capillary refill takes less than 2 seconds.   Neurological:      General: No focal deficit present. "      Mental Status: He is alert and oriented to person, place, and time.      Gait: Gait is intact.   Psychiatric:         Attention and Perception: Attention normal.         Mood and Affect: Mood normal.         Behavior: Behavior normal.          Procedures     Assessment and Plan  Diagnoses and all orders for this visit:    1. Essential hypertension (Primary)  -     amLODIPine (NORVASC) 10 MG tablet; Take 1 tablet by mouth Daily.  Dispense: 30 tablet; Refill: 5    2. New onset type 2 diabetes mellitus  -     Ambulatory Referral for Diabetic Eye Exam-Ophthalmology    3. Morbidly obese      BP still elevated, increase Amlodipine to 10mg  Cont insulin as prescribed, blood sugar readings seem to be doing well  Cont with low carb diet  Will check lipids and urine micro next visit    Return in about 2 months (around 12/20/2023) for Recheck DM and HTN.        GABY Best

## 2023-12-11 ENCOUNTER — TELEPHONE (OUTPATIENT)
Dept: INTERNAL MEDICINE | Facility: CLINIC | Age: 33
End: 2023-12-11
Payer: MEDICAID

## 2023-12-11 DIAGNOSIS — E11.9 NEW ONSET TYPE 2 DIABETES MELLITUS: ICD-10-CM

## 2023-12-11 RX ORDER — INSULIN GLARGINE 100 [IU]/ML
12 INJECTION, SOLUTION SUBCUTANEOUS NIGHTLY
Qty: 4 ML | Refills: 3 | Status: SHIPPED | OUTPATIENT
Start: 2023-12-11

## 2023-12-11 NOTE — TELEPHONE ENCOUNTER
Caller: Mario Sanchez     Relationship:PATIENT     Callback number: [phone]    Is it ok to leave a message: [x] Yes [] No    Requested medication for samples: LANTUS SOLOSTAR INSULIN PEN    Insulin Glargine (BASAGLAR KWIKPEN) 100 UNIT/ML injection pen     How much medication does the patient currently have left: 2 DAYS    Who will be picking up the samples: PATIENT     Do you need information about patient financial assistance for this medication: [] Yes [x] No    Additional details provided: PATIENT HAS AN APPOINTMENT WITH HIS PCP ON 12/20/23 AND IS REQUESTING ENOUGH MEDICATION TO LAST UNTIL HIS APPOINTMENT

## 2023-12-20 ENCOUNTER — OFFICE VISIT (OUTPATIENT)
Dept: INTERNAL MEDICINE | Facility: CLINIC | Age: 33
End: 2023-12-20

## 2023-12-20 ENCOUNTER — LAB (OUTPATIENT)
Dept: INTERNAL MEDICINE | Facility: CLINIC | Age: 33
End: 2023-12-20
Payer: MEDICAID

## 2023-12-20 VITALS
HEIGHT: 72 IN | TEMPERATURE: 98 F | SYSTOLIC BLOOD PRESSURE: 142 MMHG | OXYGEN SATURATION: 99 % | BODY MASS INDEX: 42.66 KG/M2 | DIASTOLIC BLOOD PRESSURE: 82 MMHG | WEIGHT: 315 LBS | HEART RATE: 84 BPM

## 2023-12-20 DIAGNOSIS — E11.649 TYPE 2 DIABETES MELLITUS WITH HYPOGLYCEMIA WITHOUT COMA, WITH LONG-TERM CURRENT USE OF INSULIN: Primary | ICD-10-CM

## 2023-12-20 DIAGNOSIS — E66.01 MORBIDLY OBESE: ICD-10-CM

## 2023-12-20 DIAGNOSIS — I10 ESSENTIAL HYPERTENSION: ICD-10-CM

## 2023-12-20 DIAGNOSIS — R73.09 HEMOGLOBIN A1C 8.0% OR GREATER: ICD-10-CM

## 2023-12-20 DIAGNOSIS — I10 ESSENTIAL HYPERTENSION: Primary | ICD-10-CM

## 2023-12-20 DIAGNOSIS — Z13.6 SCREENING FOR CARDIOVASCULAR CONDITION: ICD-10-CM

## 2023-12-20 DIAGNOSIS — Z79.4 TYPE 2 DIABETES MELLITUS WITH HYPOGLYCEMIA WITHOUT COMA, WITH LONG-TERM CURRENT USE OF INSULIN: Primary | ICD-10-CM

## 2023-12-20 DIAGNOSIS — E87.6 HYPOKALEMIA: Primary | ICD-10-CM

## 2023-12-20 LAB
ALBUMIN SERPL-MCNC: 3.7 G/DL (ref 3.5–5.2)
ALBUMIN UR-MCNC: 1.6 MG/DL
ALBUMIN/GLOB SERPL: 0.9 G/DL
ALP SERPL-CCNC: 104 U/L (ref 39–117)
ALT SERPL W P-5'-P-CCNC: 25 U/L (ref 1–41)
ANION GAP SERPL CALCULATED.3IONS-SCNC: 10.4 MMOL/L (ref 5–15)
AST SERPL-CCNC: 18 U/L (ref 1–40)
BASOPHILS # BLD AUTO: 0.05 10*3/MM3 (ref 0–0.2)
BASOPHILS NFR BLD AUTO: 0.5 % (ref 0–1.5)
BILIRUB SERPL-MCNC: 0.4 MG/DL (ref 0–1.2)
BUN SERPL-MCNC: 9 MG/DL (ref 6–20)
BUN/CREAT SERPL: 12.5 (ref 7–25)
CALCIUM SPEC-SCNC: 8.6 MG/DL (ref 8.6–10.5)
CHLORIDE SERPL-SCNC: 101 MMOL/L (ref 98–107)
CHOLEST SERPL-MCNC: 148 MG/DL (ref 0–200)
CO2 SERPL-SCNC: 28.6 MMOL/L (ref 22–29)
CREAT SERPL-MCNC: 0.72 MG/DL (ref 0.76–1.27)
DEPRECATED RDW RBC AUTO: 37 FL (ref 37–54)
EGFRCR SERPLBLD CKD-EPI 2021: 122.9 ML/MIN/1.73
EOSINOPHIL # BLD AUTO: 0.18 10*3/MM3 (ref 0–0.4)
EOSINOPHIL NFR BLD AUTO: 1.8 % (ref 0.3–6.2)
ERYTHROCYTE [DISTWIDTH] IN BLOOD BY AUTOMATED COUNT: 12.2 % (ref 12.3–15.4)
EXPIRATION DATE: ABNORMAL
GLOBULIN UR ELPH-MCNC: 4 GM/DL
GLUCOSE SERPL-MCNC: 132 MG/DL (ref 65–99)
HBA1C MFR BLD: 8.4 % (ref 4.5–5.7)
HCT VFR BLD AUTO: 40.8 % (ref 37.5–51)
HDLC SERPL-MCNC: 38 MG/DL (ref 40–60)
HGB BLD-MCNC: 14.5 G/DL (ref 13–17.7)
IMM GRANULOCYTES # BLD AUTO: 0.03 10*3/MM3 (ref 0–0.05)
IMM GRANULOCYTES NFR BLD AUTO: 0.3 % (ref 0–0.5)
LDLC SERPL CALC-MCNC: 98 MG/DL (ref 0–100)
LDLC/HDLC SERPL: 2.61 {RATIO}
LYMPHOCYTES # BLD AUTO: 3.09 10*3/MM3 (ref 0.7–3.1)
LYMPHOCYTES NFR BLD AUTO: 30.6 % (ref 19.6–45.3)
Lab: ABNORMAL
MCH RBC QN AUTO: 29.9 PG (ref 26.6–33)
MCHC RBC AUTO-ENTMCNC: 35.5 G/DL (ref 31.5–35.7)
MCV RBC AUTO: 84.1 FL (ref 79–97)
MONOCYTES # BLD AUTO: 0.61 10*3/MM3 (ref 0.1–0.9)
MONOCYTES NFR BLD AUTO: 6 % (ref 5–12)
NEUTROPHILS NFR BLD AUTO: 6.13 10*3/MM3 (ref 1.7–7)
NEUTROPHILS NFR BLD AUTO: 60.8 % (ref 42.7–76)
NRBC BLD AUTO-RTO: 0 /100 WBC (ref 0–0.2)
PLATELET # BLD AUTO: 261 10*3/MM3 (ref 140–450)
PMV BLD AUTO: 9.9 FL (ref 6–12)
POTASSIUM SERPL-SCNC: 3 MMOL/L (ref 3.5–5.2)
PROT SERPL-MCNC: 7.7 G/DL (ref 6–8.5)
RBC # BLD AUTO: 4.85 10*6/MM3 (ref 4.14–5.8)
SODIUM SERPL-SCNC: 140 MMOL/L (ref 136–145)
TRIGL SERPL-MCNC: 55 MG/DL (ref 0–150)
TSH SERPL DL<=0.05 MIU/L-ACNC: 1.03 UIU/ML (ref 0.27–4.2)
VLDLC SERPL-MCNC: 12 MG/DL (ref 5–40)
WBC NRBC COR # BLD AUTO: 10.09 10*3/MM3 (ref 3.4–10.8)

## 2023-12-20 PROCEDURE — 84443 ASSAY THYROID STIM HORMONE: CPT | Performed by: NURSE PRACTITIONER

## 2023-12-20 PROCEDURE — 36415 COLL VENOUS BLD VENIPUNCTURE: CPT | Performed by: NURSE PRACTITIONER

## 2023-12-20 PROCEDURE — 82043 UR ALBUMIN QUANTITATIVE: CPT | Performed by: NURSE PRACTITIONER

## 2023-12-20 PROCEDURE — 80053 COMPREHEN METABOLIC PANEL: CPT | Performed by: NURSE PRACTITIONER

## 2023-12-20 PROCEDURE — 85025 COMPLETE CBC W/AUTO DIFF WBC: CPT | Performed by: NURSE PRACTITIONER

## 2023-12-20 PROCEDURE — 80061 LIPID PANEL: CPT | Performed by: NURSE PRACTITIONER

## 2023-12-20 RX ORDER — INSULIN GLARGINE 100 [IU]/ML
12 INJECTION, SOLUTION SUBCUTANEOUS NIGHTLY
Qty: 4 ML | Refills: 3 | Status: SHIPPED | OUTPATIENT
Start: 2023-12-20

## 2023-12-20 RX ORDER — POTASSIUM CHLORIDE 750 MG/1
10 TABLET, FILM COATED, EXTENDED RELEASE ORAL 2 TIMES DAILY
Qty: 14 TABLET | Refills: 0 | Status: SHIPPED | OUTPATIENT
Start: 2023-12-20 | End: 2023-12-27

## 2023-12-20 NOTE — PROGRESS NOTES
"Chief Complaint   Patient presents with    Follow-up    Diabetes    Hypertension       HPI  Mario Sanchez is a 33 y.o. male presents for follow-up on DM and HTN.  His last A1c was 10.7 and he was started on insulin and Metformin at that time.  He has been using samples of insulin from the office.  Has completely stopped drinking soda.  Had to cancel his eye appt - he plans to reschedule.    The following portions of the patient's history were reviewed and updated as appropriate: allergies, current medications, past family history, past medical history, past social history, past surgical history, and problem list.    Subjective  Review of Systems   Constitutional:  Negative for activity change, appetite change and fatigue.   HENT:  Negative for congestion.    Respiratory:  Negative for cough and shortness of breath.    Cardiovascular:  Negative for chest pain and leg swelling.   Gastrointestinal:  Negative for abdominal pain.   Neurological:  Negative for dizziness, weakness and confusion.   Psychiatric/Behavioral:  Negative for behavioral problems and decreased concentration.        Objective  Visit Vitals  /82 (BP Location: Left arm, Patient Position: Sitting)   Pulse 84   Temp 98 °F (36.7 °C)   Ht 182.9 cm (72\")   Wt (!) 182 kg (400 lb 12.8 oz)   SpO2 99%   BMI 54.36 kg/m²        Physical Exam  Vitals and nursing note reviewed.   Constitutional:       Appearance: He is morbidly obese.   HENT:      Head: Normocephalic.   Eyes:      Pupils: Pupils are equal, round, and reactive to light.   Cardiovascular:      Rate and Rhythm: Normal rate and regular rhythm.      Pulses: Normal pulses.      Heart sounds: Normal heart sounds.   Pulmonary:      Effort: Pulmonary effort is normal.      Breath sounds: Normal breath sounds.   Skin:     General: Skin is warm and dry.      Capillary Refill: Capillary refill takes less than 2 seconds.   Neurological:      General: No focal deficit present.      Mental Status: He is " alert and oriented to person, place, and time.      Gait: Gait is intact.   Psychiatric:         Attention and Perception: Attention normal.         Mood and Affect: Mood normal.         Behavior: Behavior normal.          Procedures       Results for orders placed or performed in visit on 12/20/23   POC Glycosylated Hemoglobin (Hb A1C)    Specimen: Blood   Result Value Ref Range    Hemoglobin A1C 8.4 (A) 4.5 - 5.7 %    Lot Number [id number]     Expiration Date 07/02/2025         Assessment and Plan  Diagnoses and all orders for this visit:    1. Type 2 diabetes mellitus with hypoglycemia without coma, with long-term current use of insulin (Primary)  -     POC Glycosylated Hemoglobin (Hb A1C)  -     Insulin Glargine (BASAGLAR KWIKPEN) 100 UNIT/ML injection pen; Inject 12 Units under the skin into the appropriate area as directed Every Night.  Dispense: 4 mL; Refill: 3  -     CBC w AUTO Differential  -     Comprehensive Metabolic Panel  -     MicroAlbumin, Urine, Random - Urine, Clean Catch    2. Screening for cardiovascular condition  -     Lipid Panel    3. Morbidly obese  -     TSH Rfx On Abnormal To Free T4    4. Essential hypertension    5. Hemoglobin A1c 8.0% or greater      A1c improving, cont insulin & Metformin   Check labs today  BP borderline, pt states he has been checking it at home and has been below 130/80, will cont to monitor  Cont avoiding carbs, will aid in weight loss    Return in about 3 months (around 3/20/2024) for Diabetes.        GABY Best

## 2024-05-22 ENCOUNTER — OFFICE VISIT (OUTPATIENT)
Dept: INTERNAL MEDICINE | Facility: CLINIC | Age: 34
End: 2024-05-22
Payer: COMMERCIAL

## 2024-05-22 VITALS
OXYGEN SATURATION: 99 % | TEMPERATURE: 98 F | DIASTOLIC BLOOD PRESSURE: 78 MMHG | WEIGHT: 315 LBS | BODY MASS INDEX: 39.17 KG/M2 | SYSTOLIC BLOOD PRESSURE: 130 MMHG | HEIGHT: 75 IN | HEART RATE: 74 BPM

## 2024-05-22 DIAGNOSIS — R73.09 HEMOGLOBIN A1C LESS THAN 7.0%: ICD-10-CM

## 2024-05-22 DIAGNOSIS — I10 ESSENTIAL HYPERTENSION: ICD-10-CM

## 2024-05-22 DIAGNOSIS — Z79.4 TYPE 2 DIABETES MELLITUS WITH HYPOGLYCEMIA WITHOUT COMA, WITH LONG-TERM CURRENT USE OF INSULIN: Primary | ICD-10-CM

## 2024-05-22 DIAGNOSIS — E11.649 TYPE 2 DIABETES MELLITUS WITH HYPOGLYCEMIA WITHOUT COMA, WITH LONG-TERM CURRENT USE OF INSULIN: Primary | ICD-10-CM

## 2024-05-22 DIAGNOSIS — E78.5 HYPERLIPIDEMIA WITH TARGET LDL LESS THAN 100: ICD-10-CM

## 2024-05-22 LAB
EXPIRATION DATE: ABNORMAL
HBA1C MFR BLD: 6 % (ref 4.5–5.7)
Lab: ABNORMAL

## 2024-05-22 PROCEDURE — 99214 OFFICE O/P EST MOD 30 MIN: CPT | Performed by: NURSE PRACTITIONER

## 2024-05-22 PROCEDURE — 83036 HEMOGLOBIN GLYCOSYLATED A1C: CPT | Performed by: NURSE PRACTITIONER

## 2024-05-22 RX ORDER — AMLODIPINE BESYLATE 10 MG/1
10 TABLET ORAL DAILY
Qty: 90 TABLET | Refills: 4 | Status: SHIPPED | OUTPATIENT
Start: 2024-05-22

## 2024-05-22 RX ORDER — SEMAGLUTIDE 1.34 MG/ML
0.25 INJECTION, SOLUTION SUBCUTANEOUS WEEKLY
Qty: 1.5 ML | Refills: 5 | Status: SHIPPED | OUTPATIENT
Start: 2024-05-22

## 2024-05-22 NOTE — PROGRESS NOTES
"Chief Complaint   Patient presents with    Diabetes    Follow-up    Hypertension       HPI  Mario Sanchez is a 34 y.o. male presents for follow-up on diabetes and HTN.  His last A1c was down 8.4.  He states he is doing well.  He has been watching his carb intake.  He has lost 6 lbs since last visit.  He has been trying to walk more.  He has cut out soda and rarely drinks one.  He has a workout plan that he needs to put in action.     The following portions of the patient's history were reviewed and updated as appropriate: allergies, current medications, past family history, past medical history, past social history, past surgical history, and problem list.    Subjective  Review of Systems   Constitutional:  Negative for activity change, appetite change and fatigue.   HENT:  Negative for congestion.    Respiratory:  Negative for cough and shortness of breath.    Cardiovascular:  Negative for chest pain and leg swelling.   Gastrointestinal:  Negative for abdominal pain.   Neurological:  Negative for dizziness, weakness and confusion.   Psychiatric/Behavioral:  Negative for behavioral problems and decreased concentration.        Objective  Visit Vitals  /78 (BP Location: Left arm, Patient Position: Sitting)   Pulse 74   Temp 98 °F (36.7 °C)   Ht 190.5 cm (75\")   Wt (!) 179 kg (394 lb)   SpO2 99%   BMI 49.25 kg/m²        Physical Exam  Vitals and nursing note reviewed.   Constitutional:       Appearance: He is morbidly obese.   HENT:      Head: Normocephalic.   Eyes:      Pupils: Pupils are equal, round, and reactive to light.   Cardiovascular:      Rate and Rhythm: Normal rate and regular rhythm.      Pulses: Normal pulses.      Heart sounds: Normal heart sounds.   Pulmonary:      Effort: Pulmonary effort is normal. No respiratory distress.      Breath sounds: Normal breath sounds.   Skin:     General: Skin is warm and dry.      Capillary Refill: Capillary refill takes less than 2 seconds.   Neurological:      " General: No focal deficit present.      Mental Status: He is alert and oriented to person, place, and time.      Gait: Gait is intact.   Psychiatric:         Attention and Perception: Attention normal.         Mood and Affect: Mood normal.         Behavior: Behavior normal.          Procedures     Class 3 Severe Obesity (BMI >=40). Obesity-related health conditions include the following: hypertension and diabetes mellitus. Obesity is improving with lifestyle modifications. BMI is is above average; BMI management plan is completed. We discussed portion control and increasing exercise.       Results for orders placed or performed in visit on 05/22/24   POC Glycosylated Hemoglobin (Hb A1C)    Specimen: Blood   Result Value Ref Range    Hemoglobin A1C 6.0 (A) 4.5 - 5.7 %    Lot Number [id number]     Expiration Date 12/03/2025             Body mass index is 49.25 kg/m².  Assessment and Plan  Diagnoses and all orders for this visit:    1. Type 2 diabetes mellitus with hypoglycemia without coma, with long-term current use of insulin (Primary)  -     POC Glycosylated Hemoglobin (Hb A1C)  -     Semaglutide,0.25 or 0.5MG/DOS, (Ozempic, 0.25 or 0.5 MG/DOSE,) 2 MG/1.5ML solution pen-injector; Inject 0.25 mg under the skin into the appropriate area as directed 1 (One) Time Per Week.  Dispense: 1.5 mL; Refill: 5    2. Essential hypertension  -     amLODIPine (NORVASC) 10 MG tablet; Take 1 tablet by mouth Daily.  Dispense: 90 tablet; Refill: 4    3. Hemoglobin A1c less than 7.0%    4. Hyperlipidemia with target LDL less than 100    A1c much improved at 6.0  Add Ozempic, will likely help with weight loss too  HTN stable on Amlodipine    Return in about 3 months (around 8/22/2024) for Diabetes.        GABY Best

## 2024-05-23 ENCOUNTER — TELEPHONE (OUTPATIENT)
Dept: INTERNAL MEDICINE | Facility: CLINIC | Age: 34
End: 2024-05-23

## 2024-05-23 NOTE — TELEPHONE ENCOUNTER
Caller:     GRZEGORZ AZEVEDO    Relationship:    WIFE    Callback number:       [phone] (Home)      Is it ok to leave a message: [x] Yes [] No    Requested medication for samples:     Semaglutide,0.25 or 0.5MG/DOS, (Ozempic, 0.25 or 0.5 MG/DOSE,) 2 MG/1.5ML solution pen-injector     How much medication does the patient currently have left:     CALLER STATED PATIENT IS OUT OF THE MEDICATION CURRENTLY    CALLER ALSO STATED IT WILL APPROXIMATELY BE (3) WEEKS BEFORE MEDICATION MAY BE FILLED ACCORDING TO PATIENT'S INSURANCE    Who will be picking up the samples:     PATIENT WILL PICK THE SAMPLES UP FOR THE OFFICE IF THEY ARE AVAILABLE    Do you need information about patient financial assistance for this medication: [] Yes [x] No    Additional details provided:     PLEASE CONTACT CALLER IF SAMPLES ARE AVAILABLE

## 2024-05-28 ENCOUNTER — TELEPHONE (OUTPATIENT)
Dept: INTERNAL MEDICINE | Facility: CLINIC | Age: 34
End: 2024-05-28

## 2024-05-28 NOTE — TELEPHONE ENCOUNTER
Caller: Carolyn Middleton    Relationship: Emergency Contact    Best call back number: [phone]     What is the best time to reach you: ANYTIME    Who are you requesting to speak with (clinical staff, provider,  specific staff member): PROVIDER OR CLINICAL    Do you know the name of the person who called: NA    What was the call regarding: PATIENT WIFE CALLING AND IS NEEDING TO NOW HOW THE PATIENT IS TO BE TAKING THE OZEMPIC SAMPLES. THERE IS TWO DIRECTION ON THE BOX.     Is it okay if the provider responds through MyChart: NO

## 2024-05-28 NOTE — TELEPHONE ENCOUNTER
Patient is requesting a medication alternative to his Ozempic for the next 3 weeks. He had an insurance change and they cannot fill his prescription for about 3 weeks, and the patient will run out. He would like an alternative until he can get it filled. Patient is requesting a call back as he does not have access to his mychart.

## 2024-05-28 NOTE — TELEPHONE ENCOUNTER
CALLED TO CHECK  STATUS FOR RX OZEMPIC SAMPLES, STATED THAT THEY WILL BE LEAVING TO GO OUT OF TOWN IN COUPLE DAYS AND WILL NEEDS SAMPLE PRIOR TO LEAVING, WAS TOLD THAT RX OZEMPIC COULD TAKE UP TO 3 WEEKS FOR REFILL..

## 2024-05-28 NOTE — TELEPHONE ENCOUNTER
Instructions given to spouse on injection dose and application. Instructed to follow up after third dose to discuss toleration and dose. Spouse verbalized good understanding, agreement, and appreciation

## 2024-08-03 ENCOUNTER — HOSPITAL ENCOUNTER (EMERGENCY)
Facility: HOSPITAL | Age: 34
Discharge: HOME | End: 2024-08-03
Attending: EMERGENCY MEDICINE
Payer: COMMERCIAL

## 2024-08-03 ENCOUNTER — APPOINTMENT (OUTPATIENT)
Facility: HOSPITAL | Age: 34
End: 2024-08-03
Payer: COMMERCIAL

## 2024-08-03 VITALS
HEART RATE: 74 BPM | DIASTOLIC BLOOD PRESSURE: 95 MMHG | SYSTOLIC BLOOD PRESSURE: 144 MMHG | HEIGHT: 75 IN | OXYGEN SATURATION: 98 % | BODY MASS INDEX: 39.17 KG/M2 | RESPIRATION RATE: 16 BRPM | WEIGHT: 315 LBS | TEMPERATURE: 98.4 F

## 2024-08-03 DIAGNOSIS — M51.36 DDD (DEGENERATIVE DISC DISEASE), LUMBAR: ICD-10-CM

## 2024-08-03 DIAGNOSIS — G57.31 PERONEAL NERVE PALSY, RIGHT: Primary | ICD-10-CM

## 2024-08-03 PROCEDURE — 99284 EMERGENCY DEPT VISIT MOD MDM: CPT

## 2024-08-03 PROCEDURE — 72131 CT LUMBAR SPINE W/O DYE: CPT

## 2024-08-03 RX ORDER — METHYLPREDNISOLONE 4 MG/1
TABLET ORAL
Qty: 21 TABLET | Refills: 0 | Status: SHIPPED | OUTPATIENT
Start: 2024-08-03

## 2024-08-03 RX ORDER — CYCLOBENZAPRINE HCL 10 MG
10 TABLET ORAL 3 TIMES DAILY PRN
Qty: 12 TABLET | Refills: 0 | Status: SHIPPED | OUTPATIENT
Start: 2024-08-03

## 2024-08-03 RX ORDER — DICLOFENAC SODIUM 75 MG/1
75 TABLET, DELAYED RELEASE ORAL 2 TIMES DAILY
Qty: 12 TABLET | Refills: 0 | Status: SHIPPED | OUTPATIENT
Start: 2024-08-03

## 2024-08-03 NOTE — FSED PROVIDER NOTE
"Subjective  History of Present Illness:    Patient is a 34-year-old male presenting to the emergency department complaints of leg numbness.  States symptoms started approximately a month ago with an acute sciatica flare.  States that he back and has somewhat improved continue to have numbness in his right leg below the knee.  He also states he is unable to dorsiflex his foot and has had multiple falls.  He denies saddle paresthesias, changes in bowel or bladder control.      Nurses Notes reviewed and agree, including vitals, allergies, social history and prior medical history.     REVIEW OF SYSTEMS: All systems reviewed and not pertinent unless noted.  Review of Systems   Musculoskeletal:  Positive for back pain.   All other systems reviewed and are negative.      Past Medical History:   Diagnosis Date    Diabetes mellitus     Hypertension     Kidney stone        Allergies:    Patient has no known allergies.      Past Surgical History:   Procedure Laterality Date    HIP SURGERY Bilateral     HIP SURGERY Bilateral          Social History     Socioeconomic History    Marital status: Single   Tobacco Use    Smoking status: Never    Smokeless tobacco: Never   Vaping Use    Vaping status: Never Used   Substance and Sexual Activity    Alcohol use: Never    Drug use: Never    Sexual activity: Defer         Family History   Problem Relation Age of Onset    Carpal tunnel syndrome Mother        Objective  Physical Exam:  /95   Pulse 74   Temp 98.4 °F (36.9 °C) (Oral)   Resp 16   Ht 190.5 cm (75\")   Wt (!) 176 kg (387 lb)   SpO2 98%   BMI 48.37 kg/m²      Physical Exam  Vitals and nursing note reviewed.   Constitutional:       Appearance: Normal appearance. He is normal weight.   HENT:      Head: Normocephalic and atraumatic.   Eyes:      Extraocular Movements: Extraocular movements intact.      Pupils: Pupils are equal, round, and reactive to light.   Cardiovascular:      Rate and Rhythm: Normal rate. "   Pulmonary:      Effort: Pulmonary effort is normal.      Breath sounds: Normal breath sounds.   Musculoskeletal:      Cervical back: Normal range of motion and neck supple.      Lumbar back: Tenderness present.      Comments: Weakness decreased in right lower extremity with foot drop.  Patient unable to dorsiflex.   Skin:     General: Skin is warm and dry.   Neurological:      General: No focal deficit present.      Mental Status: He is alert and oriented to person, place, and time. Mental status is at baseline.   Psychiatric:         Mood and Affect: Mood normal.         Behavior: Behavior normal.         Thought Content: Thought content normal.         Judgment: Judgment normal.         Procedures    ED Course:     Patient was evaluated for foot drop that seemingly started over a month ago acute sciatica flare.  Patient unable to dorsiflex and has had multiple falls due to foot drop.  CT of the lumbar spine does show degenerative disc disease with severe foraminal stenosis.  Patient was started on steroids.  He was also put in a walking boot.  He was advised to follow-up with neurosurgery.    Lab Results (last 24 hours)       ** No results found for the last 24 hours. **             CT Lumbar Spine Without Contrast    Result Date: 8/3/2024  CT LUMBAR SPINE WO CONTRAST Date of Exam: 8/3/2024 2:05 PM EDT Indication: low back pain, foot drop. Comparison: 10/15/2020 MR lumbar spine Technique: Axial CT images were obtained of the lumbar spine without contrast administration.  Reconstructed coronal and sagittal images were also obtained. Automated exposure control and iterative construction methods were used. Findings: Alignment: No spondylolisthesis. No lateral curvature. Posterior element alignment appears intact. Bones: No evidence of fracture of the vertebral bodies or posterior elements. Normal vertebral body heights. Degenerative changes: Disc degenerative disease most advanced at L4-5 and L5-S1 with moderate  sized disc osteophyte complexes. Facet arthropathy most advanced at L4-5 and L5-S1. There is high-grade canal stenosis at L4-5 and L5-S1. There is at least moderate canal stenosis at L3-4. Severe bilateral foraminal stenosis at L1-L2 and L5-S1. Soft tissues: Visualized abdominal organs are unremarkable.     Impression: Impression: No evidence of fracture or malalignment. Degenerative changes of the lumbar spine most severe at L4-5 and L5-S1 where there is severe canal stenosis and at L1-2 and L5-S1 where there is severe bilateral foraminal stenosis. Electronically Signed: Chavo Whitt MD  8/3/2024 2:23 PM EDT  Workstation ID: [id number]        MDM     Amount and/or Complexity of Data Reviewed  Tests in the radiology section of CPT®: reviewed  Tests in the medicine section of CPT®: reviewed          DDX: includes but is not limited to: Peroneal nerve entrapment, sciatica    Patient arrives POV with vitals interpreted by myself.     Pertinent features from physical exam: Foot drop noted to right lower extremity.  Positive straight leg raise.        Medications - No data to display    Results/clinical rationale were discussed with patient    -----  ED Disposition       None          Final diagnoses:   None     Your Follow-Up Providers    Follow-up information has not been specified.       Contact information for after-discharge care    Follow-up information has not been specified.          Your medication list        CONTINUE taking these medications        Instructions Last Dose Given Next Dose Due   Pen Needles 32G X 4 MM misc      Use 1 each Every Night.              ASK your doctor about these medications        Instructions Last Dose Given Next Dose Due   amLODIPine 10 MG tablet  Commonly known as: NORVASC      Take 1 tablet by mouth Daily.       BASAGLAR KWIKPEN 100 UNIT/ML injection pen      Inject 12 Units under the skin into the appropriate area as directed Every Night.       Ozempic (0.25 or 0.5 MG/DOSE) 2  MG/1.5ML solution pen-injector  Generic drug: Semaglutide(0.25 or 0.5MG/DOS)      Inject 0.25 mg under the skin into the appropriate area as directed 1 (One) Time Per Week.

## 2024-08-21 ENCOUNTER — OFFICE VISIT (OUTPATIENT)
Dept: NEUROSURGERY | Facility: CLINIC | Age: 34
End: 2024-08-21
Payer: COMMERCIAL

## 2024-08-21 VITALS — BODY MASS INDEX: 39.17 KG/M2 | WEIGHT: 315 LBS | TEMPERATURE: 95.9 F | HEIGHT: 75 IN

## 2024-08-21 DIAGNOSIS — M21.371 RIGHT FOOT DROP: Primary | ICD-10-CM

## 2024-08-21 DIAGNOSIS — E66.01 MORBID OBESITY: ICD-10-CM

## 2024-08-21 DIAGNOSIS — M51.36 DDD (DEGENERATIVE DISC DISEASE), LUMBAR: ICD-10-CM

## 2024-08-21 DIAGNOSIS — M54.16 LUMBAR RADICULOPATHY: ICD-10-CM

## 2024-08-21 PROCEDURE — 99203 OFFICE O/P NEW LOW 30 MIN: CPT | Performed by: NEUROLOGICAL SURGERY

## 2024-08-21 NOTE — PROGRESS NOTES
Patient: Mario Sanchez  : 1990    Primary Care Provider: Holland Reeves APRN    Requesting Provider: As above        History    Chief Complaint: Low back and right leg pain with sensory alteration and weakness.    History of Present Illness: Mr. Sanchez is a 34-year-old gentleman who is known to my service.  I last saw him in 2020.  In September of that year he began experiencing right lower extremity radicular symptoms.  He had an epidural injection performed and was been treated with gabapentin.  He had also done some therapy.  At that time he harbors intermittent numbness in the right foot.  Overall he improved.  About 5 weeks ago he developed a severe flareup of his low back pain that extended into the right leg.  He had associated tingling and numbness in a couple days later developed a dropfoot on that side.  His pain is gone away.  Weakness and numbness remain unchanged.  He has had no bowel or bladder dysfunction.  He has had no left leg symptoms.    Review of Systems   Constitutional:  Negative for activity change, appetite change, chills, diaphoresis, fatigue, fever and unexpected weight change.   HENT:  Negative for congestion, dental problem, drooling, ear discharge, ear pain, facial swelling, hearing loss, mouth sores, nosebleeds, postnasal drip, rhinorrhea, sinus pressure, sinus pain, sneezing, sore throat, tinnitus, trouble swallowing and voice change.    Eyes:  Negative for photophobia, pain, discharge, redness, itching and visual disturbance.   Respiratory:  Negative for apnea, cough, choking, chest tightness, shortness of breath, wheezing and stridor.    Cardiovascular:  Negative for chest pain, palpitations and leg swelling.   Gastrointestinal:  Negative for abdominal distention, abdominal pain, anal bleeding, blood in stool, constipation, diarrhea, nausea, rectal pain and vomiting.   Endocrine: Negative for cold intolerance, heat intolerance, polydipsia, polyphagia and  "polyuria.   Genitourinary:  Negative for decreased urine volume, difficulty urinating, dysuria, enuresis, flank pain, frequency, genital sores, hematuria and urgency.   Musculoskeletal:  Positive for back pain and gait problem. Negative for arthralgias, joint swelling, myalgias, neck pain and neck stiffness.   Skin:  Negative for color change, pallor, rash and wound.   Allergic/Immunologic: Negative for environmental allergies, food allergies and immunocompromised state.   Neurological:  Positive for weakness and numbness. Negative for dizziness, tremors, seizures, syncope, facial asymmetry, speech difficulty, light-headedness and headaches.   Hematological:  Negative for adenopathy. Does not bruise/bleed easily.   Psychiatric/Behavioral:  Negative for agitation, behavioral problems, confusion, decreased concentration, dysphoric mood, hallucinations, self-injury, sleep disturbance and suicidal ideas. The patient is not nervous/anxious and is not hyperactive.    All other systems reviewed and are negative.    The patient's past medical history, past surgical history, family history, and social history have been reviewed at length in the electronic medical record.      Physical Exam:   Temp 95.9 °F (35.5 °C) (Infrared)   Ht 190.5 cm (75\")   Wt (!) 178 kg (393 lb)   BMI 49.12 kg/m²   CONSTITUTIONAL: Patient is well-nourished, pleasant and appears stated age.  MUSCULOSKELETAL:  Straight leg raising is negative.  Ivan's Sign is negative.  ROM in the low back is normal.  Tenderness in the back to palpation is not observed.  NEUROLOGICAL:  Orientation, memory, attention span, language function, and cognition have been examined and are intact.  Strength is intact in the lower extremities to direct testing except for dorsiflexion function of his right foot which is 0-1/5.  Muscle tone is normal throughout.  Station and gait are limping.  Sensation is intact to light touch testing throughout except in the right lateral " calf and in the top of the right foot where it is diminished.  Deep tendon reflexes are difficult to elicit throughout.  Coordination is intact.      Medical Decision Making    Data Review:   (All imaging studies were personally reviewed unless stated otherwise)  CT of the lumbar spine dated 8/3/2024 demonstrates calcified disc protrusions at L4-5 and L5-S1.  No acute recess narrowing is noted bilaterally at these levels.    Diagnosis:   1.  Right L5 radiculopathy with ongoing weakness and numbness.  2.  Chronic back pain.  3.  Lumbar degenerative disc disease.  4.  Morbid obesity.    Treatment Options:   Patient's pain is better.  I suspect he has some degree of nerve root injury given his severe ongoing weakness.  I have referred him for physical therapy and I have prescribed an AFO.  Given that his pain is better I am not sure that doing surgical intervention is going to help with his weakness.  In addition we are not seeing soft disc herniations but more calcified disc/osteophyte.  The amount of nerve root retraction that would be necessary to resect these osteophytes could be counterproductive.  The patient will follow-up in 6 weeks to check on his progress.      Scribed for Mulugeta Jesus MD by Amie Oleary CMA on 8/21/2024 10:54 EDT       I, Dr. Jesus, personally performed the services described in the documentation, as scribed in my presence, and it is both accurate and complete.

## 2024-10-22 ENCOUNTER — OFFICE VISIT (OUTPATIENT)
Dept: INTERNAL MEDICINE | Facility: CLINIC | Age: 34
End: 2024-10-22
Payer: COMMERCIAL

## 2024-10-22 VITALS
DIASTOLIC BLOOD PRESSURE: 80 MMHG | BODY MASS INDEX: 39.17 KG/M2 | WEIGHT: 315 LBS | HEART RATE: 89 BPM | TEMPERATURE: 97.9 F | HEIGHT: 75 IN | SYSTOLIC BLOOD PRESSURE: 132 MMHG

## 2024-10-22 DIAGNOSIS — I10 ESSENTIAL HYPERTENSION: ICD-10-CM

## 2024-10-22 DIAGNOSIS — E11.9 TYPE 2 DIABETES MELLITUS WITHOUT COMPLICATION, WITHOUT LONG-TERM CURRENT USE OF INSULIN: Primary | ICD-10-CM

## 2024-10-22 DIAGNOSIS — E66.01 MORBIDLY OBESE: ICD-10-CM

## 2024-10-22 DIAGNOSIS — M54.16 LUMBAR RADICULOPATHY: ICD-10-CM

## 2024-10-22 LAB
AMPHET+METHAMPHET UR QL: NEGATIVE
AMPHETAMINES UR QL: NEGATIVE
BARBITURATES UR QL SCN: NEGATIVE
BENZODIAZ UR QL SCN: NEGATIVE
BUPRENORPHINE SERPL-MCNC: NEGATIVE NG/ML
CANNABINOIDS SERPL QL: POSITIVE
COCAINE UR QL: NEGATIVE
EXPIRATION DATE: ABNORMAL
FENTANYL UR-MCNC: NEGATIVE NG/ML
HBA1C MFR BLD: 5.9 % (ref 4.5–5.7)
Lab: ABNORMAL
METHADONE UR QL SCN: NEGATIVE
OPIATES UR QL: POSITIVE
OXYCODONE UR QL SCN: NEGATIVE
PCP UR QL SCN: NEGATIVE
TRICYCLICS UR QL SCN: NEGATIVE

## 2024-10-22 PROCEDURE — 83036 HEMOGLOBIN GLYCOSYLATED A1C: CPT | Performed by: NURSE PRACTITIONER

## 2024-10-22 PROCEDURE — 82570 ASSAY OF URINE CREATININE: CPT | Performed by: NURSE PRACTITIONER

## 2024-10-22 PROCEDURE — 80307 DRUG TEST PRSMV CHEM ANLYZR: CPT | Performed by: NURSE PRACTITIONER

## 2024-10-22 PROCEDURE — 99214 OFFICE O/P EST MOD 30 MIN: CPT | Performed by: NURSE PRACTITIONER

## 2024-10-22 PROCEDURE — 82043 UR ALBUMIN QUANTITATIVE: CPT | Performed by: NURSE PRACTITIONER

## 2024-10-22 RX ORDER — PREGABALIN 75 MG/1
75 CAPSULE ORAL 2 TIMES DAILY
Qty: 60 CAPSULE | Refills: 0 | Status: SHIPPED | OUTPATIENT
Start: 2024-10-22

## 2024-10-22 RX ORDER — SEMAGLUTIDE 0.68 MG/ML
0.5 INJECTION, SOLUTION SUBCUTANEOUS WEEKLY
Qty: 3 ML | Refills: 3 | Status: SHIPPED | OUTPATIENT
Start: 2024-10-22

## 2024-10-22 NOTE — PROGRESS NOTES
"Chief Complaint   Patient presents with    Diabetes    Follow-up       HPI  Mario Sanchez is a 34 y.o. male presents for follow-up on diabetes.  His last A1c was 6.0.  His current treatment includes Ozempic which he tolerates well.  He states that he's been out of the Ozempic for a few weeks.  Has been seeing a neurosurgeon for sciatica of his right side since 7/5.  He states due to this he has developed drop foot.  He is wearing an AFO brace and has completed 8 weeks of physical therapy.  He states in 2020 he tried Gabapentin which made him feel weird so he stopped it.  He states \"the nerve pain is bad\".  Hasn't been as active.  He states there are times that he he has to use a cane. Has gained some weight due to immobility      The following portions of the patient's history were reviewed and updated as appropriate: allergies, current medications, past family history, past medical history, past social history, past surgical history, and problem list.    Subjective  Review of Systems   Constitutional:  Negative for activity change, appetite change and fatigue.   HENT:  Negative for congestion.    Respiratory:  Negative for cough and shortness of breath.    Cardiovascular:  Negative for chest pain and leg swelling.   Gastrointestinal:  Negative for abdominal pain.   Musculoskeletal:  Positive for arthralgias and back pain.   Neurological:  Positive for numbness. Negative for dizziness, weakness and confusion.   Psychiatric/Behavioral:  Negative for behavioral problems and decreased concentration.        Objective  Visit Vitals  /80 (BP Location: Left arm, Patient Position: Sitting)   Pulse 89   Temp 97.9 °F (36.6 °C)   Ht 190.5 cm (75\")   Wt (!) 184 kg (405 lb)   BMI 50.62 kg/m²        Physical Exam  Vitals and nursing note reviewed.   Constitutional:       Appearance: He is morbidly obese.   HENT:      Head: Normocephalic.   Eyes:      Pupils: Pupils are equal, round, and reactive to light.   Cardiovascular: "      Rate and Rhythm: Normal rate and regular rhythm.      Pulses: Normal pulses.      Heart sounds: Normal heart sounds.   Pulmonary:      Effort: Pulmonary effort is normal. No respiratory distress.      Breath sounds: Normal breath sounds.   Musculoskeletal:      Comments: Wearing an AFO  brace   Skin:     General: Skin is warm and dry.      Capillary Refill: Capillary refill takes less than 2 seconds.   Neurological:      General: No focal deficit present.      Mental Status: He is alert and oriented to person, place, and time.      Gait: Gait is intact.   Psychiatric:         Attention and Perception: Attention normal.         Mood and Affect: Mood normal.         Behavior: Behavior normal.          Procedures       Results for orders placed or performed in visit on 10/22/24   POC Glycosylated Hemoglobin (Hb A1C)    Collection Time: 10/22/24  2:27 PM    Specimen: Blood   Result Value Ref Range    Hemoglobin A1C 5.9 (A) 4.5 - 5.7 %    Lot Number [id number]     Expiration Date 04/04/2026           Assessment and Plan  Diagnoses and all orders for this visit:    1. Type 2 diabetes mellitus without complication, without long-term current use of insulin (Primary)  -     Microalbumin / Creatinine Urine Ratio - Urine, Clean Catch; Future  -     POC Glycosylated Hemoglobin (Hb A1C)  -     Semaglutide,0.25 or 0.5MG/DOS, (Ozempic, 0.25 or 0.5 MG/DOSE,) 2 MG/3ML solution pen-injector; Inject 0.5 mg under the skin into the appropriate area as directed 1 (One) Time Per Week.  Dispense: 3 mL; Refill: 3    2. Essential hypertension    3. Morbidly obese    4. Lumbar radiculopathy  -     pregabalin (Lyrica) 75 MG capsule; Take 1 capsule by mouth 2 (Two) Times a Day.  Dispense: 60 capsule; Refill: 0  -     Urine Drug Screen - Urine, Clean Catch; Future      A1c down to 5.9  Increase Ozempic to 0.5mg  Would like to change to Mounjaro once he gets [employer]  Start Lyrica - if effective will get contract signed next visit  Obtain  JASON Pierre appropriate  HTN stable on Amlodipine  Highly recommend  to try to start increasing activity to help with wt loss      Return in about 4 weeks (around 11/19/2024) for Recheck Radiculopathy.        Holland Reeves, APRN

## 2024-10-23 LAB
ALBUMIN UR-MCNC: 1.5 MG/DL
CREAT UR-MCNC: 75.1 MG/DL
MICROALBUMIN/CREAT UR: 20 MG/G (ref 0–29)

## 2024-11-05 ENCOUNTER — OFFICE VISIT (OUTPATIENT)
Dept: NEUROSURGERY | Facility: CLINIC | Age: 34
End: 2024-11-05
Payer: COMMERCIAL

## 2024-11-05 VITALS — TEMPERATURE: 98.9 F | HEIGHT: 75 IN | BODY MASS INDEX: 39.17 KG/M2 | WEIGHT: 315 LBS

## 2024-11-05 DIAGNOSIS — M21.371 RIGHT FOOT DROP: ICD-10-CM

## 2024-11-05 DIAGNOSIS — M51.362 DEGENERATION OF INTERVERTEBRAL DISC OF LUMBAR REGION WITH DISCOGENIC BACK PAIN AND LOWER EXTREMITY PAIN: ICD-10-CM

## 2024-11-05 DIAGNOSIS — E66.01 MORBID OBESITY: ICD-10-CM

## 2024-11-05 DIAGNOSIS — M54.16 LUMBAR RADICULOPATHY: Primary | ICD-10-CM

## 2024-11-05 PROCEDURE — 99214 OFFICE O/P EST MOD 30 MIN: CPT

## 2024-11-05 NOTE — PROGRESS NOTES
Patient: Mario Sanchez  : 1990  Chart #: [id number]    Date of Service: 2024    Chief Complaint   Patient presents with    Back Pain    Leg Pain     Rt Leg side pain, RT foot drop. Pain is starting to effect the LT side.      HPI: Mr. Sanchez is a 34-year-old gentleman that is known to Dr. Jesus's service.  He has had episodes of back pain and radicular pain that have improved with conservative measures in the past.  In 2024, he developed a severe flareup of his low back pain that extended into his right leg.  There was associated numbness and tingling in his toes that developed into a dropfoot on that right ankle a few days later.  At his last office visit with Dr. Jesus on 2024, he did not describe much in the way of right leg pain.  This is changed today.  He describes pain that will start in his back and travel in his posterior hamstring into his lateral right calf.  He does have numbness and tingling of the foot.  He has also noticed he is beginning to get some pain in the left hamstring and buttock as well.  He continues to deny saddle anesthesia, incontinence, and left leg symptoms.  He recently completed a course of physical therapy and did not notice much change in the way his pain moved.  He also has not made much headway with his right ankle weakness.  His primary care provider recently started him on a course of Lyrica; 75 mg twice daily.  He did feel that this was beginning to help until yesterday when he had a pretty intense episode of back pain and leg pain.    Patient is diabetic and is currently utilizing Ozempic injections    Chronic Illnesses:    Past Medical History:   Diagnosis Date    Diabetes mellitus     Hypertension     Kidney stone          Past Surgical History:   Procedure Laterality Date    HIP SURGERY Bilateral     HIP SURGERY Bilateral        No Known Allergies      Current Outpatient Medications:     amLODIPine (NORVASC) 10 MG tablet, Take 1 tablet by mouth  Daily., Disp: 90 tablet, Rfl: 4    pregabalin (Lyrica) 75 MG capsule, Take 1 capsule by mouth 2 (Two) Times a Day., Disp: 60 capsule, Rfl: 0    Semaglutide,0.25 or 0.5MG/DOS, (Ozempic, 0.25 or 0.5 MG/DOSE,) 2 MG/3ML solution pen-injector, Inject 0.5 mg under the skin into the appropriate area as directed 1 (One) Time Per Week., Disp: 3 mL, Rfl: 3    Insulin Pen Needle (Pen Needles) 32G X 4 MM misc, Use 1 each Every Night. (Patient not taking: Reported on 11/5/2024), Disp: 30 each, Rfl: 1    Social History     Socioeconomic History    Marital status: Single   Tobacco Use    Smoking status: Never     Passive exposure: Never    Smokeless tobacco: Never   Vaping Use    Vaping status: Never Used   Substance and Sexual Activity    Alcohol use: Never    Drug use: Never    Sexual activity: Yes     Partners: Female     Birth control/protection: I.U.D.       Family History   Problem Relation Age of Onset    Carpal tunnel syndrome Mother                Social History    Tobacco Use      Smoking status: Never        Passive exposure: Never      Smokeless tobacco: Never       Review of Systems   Constitutional:  Negative for activity change, appetite change, chills, diaphoresis, fatigue, fever and unexpected weight change.   HENT:  Negative for congestion, dental problem, drooling, ear discharge, ear pain, facial swelling, hearing loss, mouth sores, nosebleeds, postnasal drip, rhinorrhea, sinus pressure, sinus pain, sneezing, sore throat, tinnitus, trouble swallowing and voice change.    Eyes:  Negative for photophobia, pain, discharge, redness, itching and visual disturbance.   Respiratory:  Negative for apnea, cough, choking, chest tightness, shortness of breath, wheezing and stridor.    Cardiovascular:  Negative for chest pain, palpitations and leg swelling.   Gastrointestinal:  Negative for abdominal distention, abdominal pain, anal bleeding, blood in stool, constipation, diarrhea, nausea, rectal pain and vomiting.  "  Endocrine: Negative for cold intolerance, heat intolerance, polydipsia, polyphagia and polyuria.   Genitourinary:  Negative for decreased urine volume, difficulty urinating, dysuria, enuresis, flank pain, frequency, genital sores, hematuria, penile discharge, penile pain, penile swelling, scrotal swelling, testicular pain and urgency.   Musculoskeletal:  Positive for arthralgias, back pain and gait problem. Negative for joint swelling, myalgias, neck pain and neck stiffness.   Skin:  Negative for color change, pallor, rash and wound.   Allergic/Immunologic: Negative for environmental allergies, food allergies and immunocompromised state.   Neurological:  Negative for dizziness, tremors, seizures, syncope, facial asymmetry, speech difficulty, weakness, light-headedness, numbness and headaches.   Hematological:  Negative for adenopathy. Does not bruise/bleed easily.   Psychiatric/Behavioral:  Negative for agitation, behavioral problems, confusion, decreased concentration, dysphoric mood, hallucinations, self-injury, sleep disturbance and suicidal ideas. The patient is not nervous/anxious and is not hyperactive.    All other systems reviewed and are negative.       Physical examination:  Temperature 98.9 °F (37.2 °C), temperature source Temporal, height 190.5 cm (75\"), weight (!) 183 kg (403 lb 6.4 oz).    Constitutional: The patient is well-developed.  He is morbidly obese.  He is in no acute distress.    HEENT: normocephalic, atraumatic, sclera clear    Musculoskeletal:  Straight leg raise is positive on the right.  Ivan signs are negative.  Tenderness to palpation in the midline lumbar spine.      Neurological Exam   A/A/C, speech clear, attention normal, conversant, answers questions appropriately, good historian.  Cranial nerves II through XII are intact.  Motor examination does not reveal weakness in the , upper or lower extremities.   Diminished sensation in the right leg to light touch testing..  Gait " is antalgic, balance is normal.   No tremors are noted.  Reflexes are intact.   Krueger is negative. Clonus is negative.     Radiographic Imaging:  For my review CT scan of the lumbar spine dated 8/3/2024 demonstrates diffuse degenerative disc disease. This is most advanced at the L4-5 and L5-S1 levels with calcified disc protrusions and associated facet arthropathy. There is canal stenosis at these after mentioned levels.  There is bilateral neuroforaminal stenosis at L5-S1.    Medical Decision Making  Diagnoses and all orders for this visit:    1. Lumbar radiculopathy (Primary)  -     MRI Lumbar Spine Without Contrast; Future  -     Ambulatory Referral to Pain Management    2. Right foot drop    3. Morbid obesity    4. Degeneration of intervertebral disc of lumbar region with discogenic back pain and lower extremity pain    When previously evaluated by Dr. Jesus, Mr. Sanchez was not endorsing much in the way of lower extremity pain.  This has changed, and he is now experiencing some symptoms in his left lower extremity.  Physical therapy has not been helpful.  I am going to order an MRI of the lumbar spine without contrast to assess the nerves and soft structures.  I have explained to him that surgery would not be a fix for his longstanding difficulties, and the patient is understanding of this.  I have also referred him to pain management for their evaluation and consideration.  He will follow-up with me in about 6 weeks to assess progress.    Any copied data from previous notes included in the (1) HPI, (2) PE, (3) MDM and/or assessment and plan has been reviewed and is accurate as of this day.    Yajaira James PA-C     Patient Care Team:  Holland Reeves APRN as PCP - General (Family Medicine)  Mulugeta Jesus MD as Surgeon (Neurosurgery)  Holland Wang PA-C as Referring Physician (Emergency Medicine)  Mulugeta Jesus MD as Consulting Physician (Neurosurgery)

## 2024-11-11 ENCOUNTER — TELEPHONE (OUTPATIENT)
Dept: NEUROSURGERY | Facility: CLINIC | Age: 34
End: 2024-11-11
Payer: COMMERCIAL

## 2024-11-11 NOTE — TELEPHONE ENCOUNTER
Provider:  Jacob  Surgery/Procedure:  ARMIDA  Surgery/Procedure Date:    Last visit:   11/5/24  Next visit: 12/18/24     Reason for call:  Patient called in regards to medication. States that there was discussion of a muscle relaxer to be prescribed, however their pharmacy did not receive any new prescriptions. I did let the patient know the provider is out of the office today, but I will get a message over and see if this was something we would prescribe.

## 2024-11-12 RX ORDER — METHOCARBAMOL 750 MG/1
750 TABLET, FILM COATED ORAL 3 TIMES DAILY PRN
Qty: 90 TABLET | Refills: 0 | Status: SHIPPED | OUTPATIENT
Start: 2024-11-12

## 2024-11-12 NOTE — TELEPHONE ENCOUNTER
PATIENTS WIFE GRZEGORZ CALLED ABOUT ANY UPDATES ABOUT A MUSCLE RELAXER THAT WAS SUPPOSED TO BE CALLED IN    PLEASE CALL PATIENT WITH UPDATE  THANK YOU    CARLYN [phone]

## 2024-11-15 ENCOUNTER — HOSPITAL ENCOUNTER (OUTPATIENT)
Facility: HOSPITAL | Age: 34
Discharge: HOME | End: 2024-11-15

## 2024-11-15 DIAGNOSIS — M54.16 LUMBAR RADICULOPATHY: ICD-10-CM

## 2024-11-15 PROCEDURE — 72148 MRI LUMBAR SPINE W/O DYE: CPT

## 2024-12-02 ENCOUNTER — OFFICE VISIT (OUTPATIENT)
Dept: PAIN MEDICINE | Facility: CLINIC | Age: 34
End: 2024-12-02
Payer: COMMERCIAL

## 2024-12-02 VITALS — WEIGHT: 315 LBS | BODY MASS INDEX: 39.17 KG/M2 | HEIGHT: 75 IN

## 2024-12-02 DIAGNOSIS — M54.16 LUMBAR RADICULOPATHY: Primary | ICD-10-CM

## 2024-12-02 PROCEDURE — 99204 OFFICE O/P NEW MOD 45 MIN: CPT | Performed by: STUDENT IN AN ORGANIZED HEALTH CARE EDUCATION/TRAINING PROGRAM

## 2024-12-02 NOTE — PROGRESS NOTES
Referring Physician: Yajaira James PA-C  [facility]  [address] [place] [address]    Primary Physician: Holland Reeves APRN    CHIEF COMPLAINT or REASON FOR VISIT: Back Pain (New patient)      Initial history of present illness on 12/02/2024:  Mr. Mario Sanchez is 34 y.o. male who presents as a new patient referral for evaluation and treatment of chronic low back pain with radiation of the right lower extremity.  Patient has longstanding history of axial low back pain issues however, in July 2024 developed new onset right-sided low back pain with radiation right lower extremity and foot.  Subsequent to this he additionally developed right dropfoot.  He has been evaluated by neurosurgery, Dr. Jesus, who did not recommend surgery at the time.  Continues management with neurosurgery and continues with surgical discussions.  Has been engaged in weight loss plan with Ozempic.  He is now wearing a right AFO.  Has tried physical therapy without benefit.  Years ago he had an injection of some kind at [facility] without benefit.  He has tried pregabalin without benefit.    Interval history:    Interventions:      Objective Pain Scoring:   BRIEF PAIN INVENTORY:  Total score:   Pain Score    12/02/24 1052   PainSc:   9   PainLoc: Back      PHQ-2: 6  PHQ-9: 17  Opioid Risk Tool:         Review of Systems:   ROS negative except as otherwise noted     Past Medical History:   Past Medical History:   Diagnosis Date    Diabetes mellitus     Hypertension     Kidney stone          Past Surgical History:   Past Surgical History:   Procedure Laterality Date    HIP SURGERY Bilateral     HIP SURGERY Bilateral          Family History   Family History   Problem Relation Age of Onset    Carpal tunnel syndrome Mother          Social History   Social History     Socioeconomic History    Marital status: Single   Tobacco Use    Smoking status: Never     Passive exposure: Never    Smokeless  "tobacco: Never   Vaping Use    Vaping status: Never Used   Substance and Sexual Activity    Alcohol use: Never    Drug use: Never    Sexual activity: Yes     Partners: Female     Birth control/protection: I.U.D.        Medications:     Current Outpatient Medications:     amLODIPine (NORVASC) 10 MG tablet, Take 1 tablet by mouth Daily., Disp: 90 tablet, Rfl: 4    methocarbamol (ROBAXIN) 750 MG tablet, Take 1 tablet by mouth 3 (Three) Times a Day As Needed for Muscle Spasms for up to 90 doses. This medicine will help with back pain from spasm take as needed use caution when starting medicine to ensure it does not cause drowsiness or other side effects, Disp: 90 tablet, Rfl: 0    pregabalin (Lyrica) 75 MG capsule, Take 1 capsule by mouth 2 (Two) Times a Day., Disp: 60 capsule, Rfl: 0    Semaglutide,0.25 or 0.5MG/DOS, (Ozempic, 0.25 or 0.5 MG/DOSE,) 2 MG/3ML solution pen-injector, Inject 0.5 mg under the skin into the appropriate area as directed 1 (One) Time Per Week., Disp: 3 mL, Rfl: 3    Insulin Pen Needle (Pen Needles) 32G X 4 MM misc, Use 1 each Every Night. (Patient not taking: Reported on 12/2/2024), Disp: 30 each, Rfl: 1        Physical Exam:     Vitals:    12/02/24 1052   Weight: (!) 182 kg (401 lb)   Height: 190.5 cm (75\")   PainSc:   9   PainLoc: Back        General: Alert and oriented, No acute distress.   HEENT: Normocephalic, atraumatic.   Cardiovascular: No gross edema  Respiratory: Respirations are non-labored    Lumbar Spine:   No masses or atrophy  Range of motion - Flexion normal. Extension normal.    Facet Loading: Negative bilaterally  Facet Palpation - Nontender   Saira finger/Gaenslen's/Ivan's/ANAT/Thigh thrust -   Straight leg raise/slump test: Positive right  Multifidus toe-touch test:    Motor Exam:       Strength: Rate on 1-5 scale Right Left    L1/2- hip flexion 5/5  5/5    L3- knee extension 5/5  5/5    L4- ankle dorsiflexion 3/5  5/5    L5- great toe extension 2/5  5/5    S1- " ankle plantarflexion 5/5  5/5    Sensory Exam: Reduced sensation to light touch right lower extremity       Neurologic: Cranial Nerves II-XII are grossly intact.      Psychiatric: Cooperative.   Gait: Antalgic  Assistive Devices: None    Imaging Studies:   Results for orders placed during the hospital encounter of 11/15/24    MRI Lumbar Spine Without Contrast    Narrative  MRI LUMBAR SPINE WO CONTRAST    Date of Exam: 11/15/2024 7:16 PM EST    Indication: low back pain, right leg pain.    Comparison: 8/3/2024    Technique:  Routine multiplanar/multisequence sequence images of the lumbar spine were obtained without contrast administration.    Findings: Lumbar vertebral body height and alignment are normal. Diminished L1 and L2 intensity of the marrow which may indicate a marrow replacing process. Disc space narrowing at L4-L5. No retroperitoneal mass or adenopathy. The conus medullaris  terminates at the L1 vertebral body level. No cord signal abnormalities.    L1-L2: No significant canal stenosis or foraminal narrowing.    L2-L3: No canal stenosis or foraminal narrowing.    L3-L4: No canal stenosis or foraminal narrowing.    L4-L5: Large disc herniation and disc bulge. Severe canal stenosis. Moderate left and mild right foraminal narrowing.    L5-S1: Disc bulge. Moderate canal stenosis. Moderate foraminal narrowing.    Impression  Disc herniation at L4-L5 resulting in severe canal stenosis.      Electronically Signed: Omer Beltran MD  11/15/2024 9:24 PM EST  Workstation ID: [id number]      Results for orders placed during the hospital encounter of 10/15/20    MRI Lumbar Spine Without Contrast    Narrative  EXAMINATION: MRI LUMBAR SPINE WO CONTRAST-    INDICATION: Right lower extremity weakness, acute onset, history of  bilateral hip surgery; M54.41-Lumbago with sciatica, right side;  R29.898-Other symptoms and signs involving the musculoskeletal system    TECHNIQUE: Multiplanar multisequence MRI of the lumbar spine  performed  without IV contrast    COMPARISON: NONE    FINDINGS: Vertebral body heights are maintained. No evidence of acute  fracture or suspicious marrow replacing lesion. There is some mild  exaggeration of usual lumbar lordosis. Otherwise no evidence of  subluxation or significant spondylolisthesis. The paraspinal soft  tissues are unremarkable. Multilevel lumbar spondylosis changes are  noted with associated areas of spinal canal and neuroforaminal  involvement noted including    At L2-3, mild bilateral facet arthropathy without significant spinal  canal or neuroforaminal narrowing.    At L3-4, facet arthropathy without significant spinal canal or  neuroforaminal narrowing.    At L4-5, moderate to large focal disc extrusion, somewhat caudally  migrated producing focal moderate to severe spinal canal narrowing.  Moderate associated bilateral subarticular recess stenosis. No  significant neuroforaminal narrowing.    L5-S1, large focal disc herniation producing moderate to severe spinal  canal narrowing. There is additional focal caudal migration towards the  right neural foramen with focal severe narrowing. Moderate left neural  foraminal narrowing.    The conus medullaris and cauda equina nerve roots are otherwise normal  in signal.    Impression  Multilevel lumbar spondylosis, somewhat focally severe at  L4-5 and L5-S1 where there are focal disc herniations producing moderate  to severe spinal canal narrowing and moderate to severe bilateral  subarticular recess stenosis at L4-5. At L5-S1 there is moderate to  severe associated spinal canal narrowing in addition to involvement of  the right neural foramen with small focal extrusion caudally migrated  producing severe foraminal narrowing.      This report was finalized on 10/16/2020 8:45 AM by Parth Cantu.        Independent review of radiographic imaging: Available for my review is a lumbar MRI dated November 15, 2024 demonstrating: Degenerative disc  disease at L4/L5 with Modic 2 endplate changes; posterior central disc herniation at L4/L5 with caudal extrusion causing severe canal stenosis; L5/S1 degenerative disc disease left paracentral disc herniation causing left-sided lateral recess stenosis    Impression & Plan:       12/02/2024: Mario Sanchez is a 34 y.o. male with past medical history significant for DM2, HTN, morbid obesity who presents to the pain clinic for evaluation and treatment of chronic low back evaluation right lower extremity and foot, right dropfoot.  MRI interpretation as above.  Evaluation consistent with lumbar radiculopathy.  We discussed epidural steroid injection to improve pain.  If greater than 50% relief for at least 2-3 months can consider repeat as needed every 3 to 4 months.  I had a discussion with the patient regarding the risks of the procedure including bleeding, infection, damage to surrounding structures.  We discussed the potential adverse effects of corticosteroid injection including flushing of the face, lipodystrophy, skin discoloration, elevated blood glucose, increased blood pressure.  Risks of frequent steroid administration include weight gain, hormonal changes, mood changes, osteoporosis.    1. Lumbar radiculopathy        PLAN:  1. Medication Recommendations: Recommend Voltaren topical, NSAIDs, Tylenol.  Can trial turmeric 500 mg twice daily if NSAID contraindicated.  -Agree with pregabalin; recommend monitor for weight gain    2. Physical Therapy: Continue HEP    3. Psychological: defer    4. Complementary and alternative (CAM) Therapies:     5. Labs/Diagnostic studies: None indicated     6. Imaging: MRI independently interpreted and reviewed with patient    7. Interventions: Schedule right paramedian lumbar interlaminar epidural steroid injection ([id number]).  Would likely enter at L5-S1    8. Referrals: None indicated     9. Records: Neurosurgery notes reviewed    10. Lifestyle goals:    Follow-up 1 month after  [facility]  Ronald Alexander MD          Quality Metrics:    Mario Sanchez reports a pain score of 9.  Given his pain assessment as noted, treatment options were discussed and the following options were decided upon as a follow-up plan to address the patient's pain: continuation of current treatment plan for pain.    Patient screened positive for depression based on a PHQ-9 score of 17 on 12/2/2024. Follow-up recommendations include: PCP managing depression.

## 2024-12-04 ENCOUNTER — TELEPHONE (OUTPATIENT)
Dept: PAIN MEDICINE | Facility: CLINIC | Age: 34
End: 2024-12-04
Payer: COMMERCIAL

## 2024-12-04 NOTE — TELEPHONE ENCOUNTER
Caller: GRZEGORZ   Relationship to Patient: WIFE  Phone Number:  [phone]  Reason for Call: PATIENTS WIFE CALLING [facility] BACK ABOUT THE CANCELED PROCEDURE DUE TO INSURANCE

## 2024-12-04 NOTE — TELEPHONE ENCOUNTER
Called patient, left  advising him that his procedure on 12/12/2024 with Dr Alexander will need to be cancelled due to lack of surgery benefits.

## 2024-12-05 ENCOUNTER — TELEPHONE (OUTPATIENT)
Dept: PAIN MEDICINE | Facility: CLINIC | Age: 34
End: 2024-12-05
Payer: COMMERCIAL

## 2024-12-05 DIAGNOSIS — M54.16 LUMBAR RADICULOPATHY: Primary | ICD-10-CM

## 2024-12-05 NOTE — TELEPHONE ENCOUNTER
Caller: Mario Sanchez    Relationship: Self    Best call back number: [phone]    What test/procedure requested: SURGERY    When is it needed: 12.12.24    Additional information or concerns: SPOKE WITH MS. SANCHEZ- SHE STATES THAT THE INSURANCE THAT THE PT WILL BE USING IS [employer]- NOT THE [employer].    SHE WOULD LIKE TO HAVE THE SURGERY APPOINTMENT BACK ON FOR 12.12.24    PLEASE CONTACT AND ADVISE    TRIED TO WT- TO ROSALEE- NO ANSWER

## 2025-01-15 DIAGNOSIS — E11.9 TYPE 2 DIABETES MELLITUS WITHOUT COMPLICATION, WITHOUT LONG-TERM CURRENT USE OF INSULIN: ICD-10-CM

## 2025-01-15 RX ORDER — SEMAGLUTIDE 0.68 MG/ML
0.5 INJECTION, SOLUTION SUBCUTANEOUS WEEKLY
Qty: 3 ML | Refills: 3 | Status: SHIPPED | OUTPATIENT
Start: 2025-01-15

## 2025-01-15 NOTE — TELEPHONE ENCOUNTER
Caller: Carolyn Middleton    Relationship: Emergency Contact    Best call back number: [phone]     Requested Prescriptions:   Requested Prescriptions     Pending Prescriptions Disp Refills    Semaglutide,0.25 or 0.5MG/DOS, (Ozempic, 0.25 or 0.5 MG/DOSE,) 2 MG/3ML solution pen-injector 3 mL 3     Sig: Inject 0.5 mg under the skin into the appropriate area as directed 1 (One) Time Per Week.        Pharmacy where request should be sent: [facility] #[id number] [facility] [address] - [phone]  - [phone] FX     Last office visit with prescribing clinician: 10/22/2024   Last telemedicine visit with prescribing clinician: Visit date not found   Next office visit with prescribing clinician: Visit date not found     Additional details provided by patient: BEEN OUT A COUPLE WEEKS. CALLBACK ONCE IT HAS BEEN SENT    Does the patient have less than a 3 day supply:  [x] Yes  [] No    Would you like a call back once the refill request has been completed: [x] Yes [] No    If the office needs to give you a call back, can they leave a voicemail: [x] Yes [] No    Elver Mcintosh Rep   01/15/25 12:16 EST

## 2025-01-23 ENCOUNTER — PRIOR AUTHORIZATION (OUTPATIENT)
Dept: INTERNAL MEDICINE | Facility: CLINIC | Age: 35
End: 2025-01-23
Payer: COMMERCIAL

## 2025-01-23 ENCOUNTER — TELEPHONE (OUTPATIENT)
Dept: INTERNAL MEDICINE | Facility: CLINIC | Age: 35
End: 2025-01-23

## 2025-01-23 NOTE — TELEPHONE ENCOUNTER
Caller: Carolyn Middleton    Relationship to patient: Emergency Contact      Best call back number: [phone]     Provider: YAYA COATS    Medication PA needed: OZEMPIC    NOTE: PLEASE CALL WHEN THIS IS COMPLETE.

## 2025-01-23 NOTE — TELEPHONE ENCOUNTER
PA for Ozempic sent to plan per covermymeds.   Awaiting determination.   (Key: [id number])    Call with determination.

## 2025-04-07 DIAGNOSIS — E11.9 TYPE 2 DIABETES MELLITUS WITHOUT COMPLICATION, WITHOUT LONG-TERM CURRENT USE OF INSULIN: ICD-10-CM

## 2025-04-07 RX ORDER — SEMAGLUTIDE 0.68 MG/ML
0.5 INJECTION, SOLUTION SUBCUTANEOUS WEEKLY
Qty: 3 ML | Refills: 3 | Status: SHIPPED | OUTPATIENT
Start: 2025-04-07

## 2025-04-07 NOTE — TELEPHONE ENCOUNTER
Caller: Carolyn Middleton    Relationship: Emergency Contact    Best call back number: [phone]     Requested Prescriptions:   Requested Prescriptions     Pending Prescriptions Disp Refills    Semaglutide,0.25 or 0.5MG/DOS, (Ozempic, 0.25 or 0.5 MG/DOSE,) 2 MG/3ML solution pen-injector 3 mL 3     Sig: Inject 0.5 mg under the skin into the appropriate area as directed 1 (One) Time Per Week.        Pharmacy where request should be sent: [facility] #[id number] [place] [id number] - [phone]  - [phone] FX     Last office visit with prescribing clinician: 10/22/2024   Last telemedicine visit with prescribing clinician: Visit date not found   Next office visit with prescribing clinician: Visit date not found     Additional details provided by patient: OUT OF MEDICATION     Does the patient have less than a 3 day supply:  [x] Yes  [] No    Would you like a call back once the refill request has been completed: [] Yes [x] No    If the office needs to give you a call back, can they leave a voicemail: [] Yes [x] No    Elver Sarabia Rep   04/07/25 09:41 EDT

## 2025-05-13 DIAGNOSIS — E11.9 TYPE 2 DIABETES MELLITUS WITHOUT COMPLICATION, WITHOUT LONG-TERM CURRENT USE OF INSULIN: ICD-10-CM

## 2025-05-13 RX ORDER — SEMAGLUTIDE 0.68 MG/ML
0.5 INJECTION, SOLUTION SUBCUTANEOUS WEEKLY
Qty: 3 ML | Refills: 3 | OUTPATIENT
Start: 2025-05-13

## 2025-05-13 NOTE — TELEPHONE ENCOUNTER
Caller: Carolyn Middleton    Relationship: Emergency Contact    Best call back number: [phone]    Requested Prescriptions:   Requested Prescriptions     Pending Prescriptions Disp Refills    Semaglutide,0.25 or 0.5MG/DOS, (Ozempic, 0.25 or 0.5 MG/DOSE,) 2 MG/3ML solution pen-injector 3 mL 3     Sig: Inject 0.5 mg under the skin into the appropriate area as directed 1 (One) Time Per Week.        Pharmacy where request should be sent: [facility] #[id number] [facility] [address] - [phone]  - [phone] FX     Last office visit with prescribing clinician: 10/22/2024   Last telemedicine visit with prescribing clinician: Visit date not found   Next office visit with prescribing clinician: Visit date not found     Additional details provided by patient: CALLER REQUESTS A 90 DAY SUPPLY.  PATIENT IS COMPLETELY OUT OF MEDICATION.      Does the patient have less than a 3 day supply:  [x] Yes  [] No    Would you like a call back once the refill request has been completed: [] Yes [x] No    If the office needs to give you a call back, can they leave a voicemail: [] Yes [x] No    Elver Coley Rep   05/13/25 10:13 EDT

## 2025-05-23 ENCOUNTER — TELEPHONE (OUTPATIENT)
Dept: INTERNAL MEDICINE | Age: 35
End: 2025-05-23

## 2025-05-23 NOTE — TELEPHONE ENCOUNTER
Caller: Carolyn Middleton    Relationship: Emergency Contact    Best call back number:       [phone] (Home)     Requested Prescriptions:     Semaglutide,0.25 or 0.5MG/DOS, (Ozempic, 0.25 or 0.5 MG/DOSE,) 2 MG/3ML solution pen-injector      Pharmacy where request should be sent:    [facility] #[id number] [facility] [address] - [phone] [facility] [phone] FX     Last office visit with prescribing clinician: 10/22/2024   Last telemedicine visit with prescribing clinician: Visit date not found   Next office visit with prescribing clinician: Visit date not found     Additional details provided by patient:     CALLER STATED PATIENT HAS BEEN OUT OF MEDICATION FOR APPROXIMATELY (3) WEEKS    Does the patient have less than a 3 day supply:  [x] Yes  [] No    Would you like a call back once the refill request has been completed: [x] Yes [] No    If the office needs to give you a call back, can they leave a voicemail: [x] Yes [] No    Elver Patterson Rep   05/23/25 08:52 EDT

## 2025-05-28 ENCOUNTER — TELEPHONE (OUTPATIENT)
Dept: INTERNAL MEDICINE | Age: 35
End: 2025-05-28
Payer: COMMERCIAL

## 2025-05-28 NOTE — TELEPHONE ENCOUNTER
Called pharmacy and verified that they have ozempic script it was filled on04/10.   Called wife and advised

## 2025-05-28 NOTE — TELEPHONE ENCOUNTER
Caller: Carolyn Middleton    Relationship: Emergency Contact    Best call back number: [phone]    What is the best time to reach you: ANYTIME     Who are you requesting to speak with (clinical staff, provider,  specific staff member): CLINICAL STAFF     PHARMACY IS TELLING PATIENT THAT HIS PRESCRIPTION WAS NEVER SENT IN APRIL. PATIENT NEEDS TO KNOW WHAT TO DO TO GET PRESCRIPTION. PLEASE CALL TO DISCUSS.

## 2025-07-02 DIAGNOSIS — I10 ESSENTIAL HYPERTENSION: ICD-10-CM

## 2025-07-02 RX ORDER — AMLODIPINE BESYLATE 10 MG/1
10 TABLET ORAL DAILY
Qty: 90 TABLET | Refills: 2 | Status: SHIPPED | OUTPATIENT
Start: 2025-07-02

## 2025-08-15 ENCOUNTER — HOSPITAL ENCOUNTER (OUTPATIENT)
Facility: HOSPITAL | Age: 35
Setting detail: OBSERVATION
Discharge: HOME | End: 2025-08-15
Attending: FAMILY MEDICINE | Admitting: FAMILY MEDICINE
Payer: COMMERCIAL

## 2025-08-15 VITALS
OXYGEN SATURATION: 100 % | HEIGHT: 76 IN | DIASTOLIC BLOOD PRESSURE: 94 MMHG | SYSTOLIC BLOOD PRESSURE: 159 MMHG | RESPIRATION RATE: 16 BRPM | HEART RATE: 87 BPM | TEMPERATURE: 97.8 F | WEIGHT: 315 LBS | BODY MASS INDEX: 38.36 KG/M2

## 2025-08-15 DIAGNOSIS — E87.6 HYPOKALEMIA: Primary | ICD-10-CM

## 2025-08-15 LAB
ALBUMIN SERPL-MCNC: 3.9 G/DL (ref 3.5–5.2)
ALBUMIN/GLOB SERPL: 1.1 G/DL
ALP SERPL-CCNC: 97 U/L (ref 39–117)
ALT SERPL W P-5'-P-CCNC: 39 U/L (ref 1–41)
ANION GAP SERPL CALCULATED.3IONS-SCNC: 12.9 MMOL/L (ref 5–15)
ANION GAP SERPL CALCULATED.3IONS-SCNC: 13.4 MMOL/L (ref 5–15)
AST SERPL-CCNC: 34 U/L (ref 1–40)
BASOPHILS # BLD AUTO: 0.01 10*3/MM3 (ref 0–0.2)
BASOPHILS NFR BLD AUTO: 0.1 % (ref 0–1.5)
BILIRUB SERPL-MCNC: 0.5 MG/DL (ref 0–1.2)
BUN SERPL-MCNC: 8.1 MG/DL (ref 6–20)
BUN SERPL-MCNC: 8.2 MG/DL (ref 6–20)
BUN/CREAT SERPL: 12.1 (ref 7–25)
BUN/CREAT SERPL: 12.1 (ref 7–25)
CALCIUM SPEC-SCNC: 8.9 MG/DL (ref 8.6–10.5)
CALCIUM SPEC-SCNC: 8.9 MG/DL (ref 8.6–10.5)
CHLORIDE SERPL-SCNC: 100 MMOL/L (ref 98–107)
CHLORIDE SERPL-SCNC: 100 MMOL/L (ref 98–107)
CO2 SERPL-SCNC: 28.6 MMOL/L (ref 22–29)
CO2 SERPL-SCNC: 29.1 MMOL/L (ref 22–29)
CREAT SERPL-MCNC: 0.67 MG/DL (ref 0.76–1.27)
CREAT SERPL-MCNC: 0.68 MG/DL (ref 0.76–1.27)
DEPRECATED RDW RBC AUTO: 36.8 FL (ref 37–54)
EGFRCR SERPLBLD CKD-EPI 2021: 123.5 ML/MIN/1.73
EGFRCR SERPLBLD CKD-EPI 2021: 124.1 ML/MIN/1.73
EOSINOPHIL # BLD AUTO: 0.2 10*3/MM3 (ref 0–0.4)
EOSINOPHIL NFR BLD AUTO: 1.9 % (ref 0.3–6.2)
ERYTHROCYTE [DISTWIDTH] IN BLOOD BY AUTOMATED COUNT: 12.4 % (ref 12.3–15.4)
GLOBULIN UR ELPH-MCNC: 3.6 GM/DL
GLUCOSE SERPL-MCNC: 165 MG/DL (ref 65–99)
GLUCOSE SERPL-MCNC: 165 MG/DL (ref 65–99)
HCT VFR BLD AUTO: 42.3 % (ref 37.5–51)
HGB BLD-MCNC: 15.1 G/DL (ref 13–17.7)
HOLD SPECIMEN: NORMAL
IMM GRANULOCYTES # BLD AUTO: 0 10*3/MM3 (ref 0–0.05)
IMM GRANULOCYTES NFR BLD AUTO: 0 % (ref 0–0.5)
LYMPHOCYTES # BLD AUTO: 3.48 10*3/MM3 (ref 0.7–3.1)
LYMPHOCYTES NFR BLD AUTO: 33.7 % (ref 19.6–45.3)
MAGNESIUM SERPL-MCNC: 1.6 MG/DL (ref 1.6–2.6)
MCH RBC QN AUTO: 29.3 PG (ref 26.6–33)
MCHC RBC AUTO-ENTMCNC: 35.7 G/DL (ref 31.5–35.7)
MCV RBC AUTO: 82.1 FL (ref 79–97)
MONOCYTES # BLD AUTO: 0.65 10*3/MM3 (ref 0.1–0.9)
MONOCYTES NFR BLD AUTO: 6.3 % (ref 5–12)
NEUTROPHILS NFR BLD AUTO: 58 % (ref 42.7–76)
NEUTROPHILS NFR BLD AUTO: 6 10*3/MM3 (ref 1.7–7)
PLATELET # BLD AUTO: 255 10*3/MM3 (ref 140–450)
PMV BLD AUTO: 9.2 FL (ref 6–12)
POTASSIUM SERPL-SCNC: 2.7 MMOL/L (ref 3.5–5.2)
POTASSIUM SERPL-SCNC: 2.8 MMOL/L (ref 3.5–5.2)
POTASSIUM SERPL-SCNC: 2.9 MMOL/L (ref 3.5–5.2)
PROT SERPL-MCNC: 7.5 G/DL (ref 6–8.5)
QT INTERVAL: 410 MS
QTC INTERVAL: 472 MS
RBC # BLD AUTO: 5.15 10*6/MM3 (ref 4.14–5.8)
SODIUM SERPL-SCNC: 142 MMOL/L (ref 136–145)
SODIUM SERPL-SCNC: 142 MMOL/L (ref 136–145)
WBC NRBC COR # BLD AUTO: 10.34 10*3/MM3 (ref 3.4–10.8)
WHOLE BLOOD HOLD COAG: NORMAL
WHOLE BLOOD HOLD SPECIMEN: NORMAL

## 2025-08-15 PROCEDURE — 85025 COMPLETE CBC W/AUTO DIFF WBC: CPT

## 2025-08-15 PROCEDURE — 83735 ASSAY OF MAGNESIUM: CPT

## 2025-08-15 PROCEDURE — 96375 TX/PRO/DX INJ NEW DRUG ADDON: CPT

## 2025-08-15 PROCEDURE — 80053 COMPREHEN METABOLIC PANEL: CPT

## 2025-08-15 PROCEDURE — 25010000002 POTASSIUM CHLORIDE 10 MEQ/100ML SOLUTION: Performed by: FAMILY MEDICINE

## 2025-08-15 PROCEDURE — G0378 HOSPITAL OBSERVATION PER HR: HCPCS

## 2025-08-15 PROCEDURE — 99285 EMERGENCY DEPT VISIT HI MDM: CPT | Performed by: FAMILY MEDICINE

## 2025-08-15 PROCEDURE — 25010000002 HYDRALAZINE PER 20 MG: Performed by: PHYSICIAN ASSISTANT

## 2025-08-15 PROCEDURE — 96376 TX/PRO/DX INJ SAME DRUG ADON: CPT

## 2025-08-15 PROCEDURE — 96366 THER/PROPH/DIAG IV INF ADDON: CPT

## 2025-08-15 PROCEDURE — 25810000003 SODIUM CHLORIDE 0.9 % SOLUTION

## 2025-08-15 PROCEDURE — 96365 THER/PROPH/DIAG IV INF INIT: CPT

## 2025-08-15 PROCEDURE — 93005 ELECTROCARDIOGRAM TRACING: CPT

## 2025-08-15 PROCEDURE — 84132 ASSAY OF SERUM POTASSIUM: CPT | Performed by: PHYSICIAN ASSISTANT

## 2025-08-15 RX ORDER — SODIUM CHLORIDE 0.9 % (FLUSH) 0.9 %
10 SYRINGE (ML) INJECTION AS NEEDED
Status: DISCONTINUED | OUTPATIENT
Start: 2025-08-15 | End: 2025-08-15 | Stop reason: HOSPADM

## 2025-08-15 RX ORDER — AMLODIPINE BESYLATE 5 MG/1
10 TABLET ORAL
Status: DISCONTINUED | OUTPATIENT
Start: 2025-08-16 | End: 2025-08-15 | Stop reason: HOSPADM

## 2025-08-15 RX ORDER — HYDRALAZINE HYDROCHLORIDE 20 MG/ML
10 INJECTION INTRAMUSCULAR; INTRAVENOUS EVERY 6 HOURS PRN
Status: DISCONTINUED | OUTPATIENT
Start: 2025-08-15 | End: 2025-08-15 | Stop reason: HOSPADM

## 2025-08-15 RX ORDER — SODIUM CHLORIDE 0.9 % (FLUSH) 0.9 %
10 SYRINGE (ML) INJECTION EVERY 12 HOURS SCHEDULED
Status: DISCONTINUED | OUTPATIENT
Start: 2025-08-15 | End: 2025-08-15 | Stop reason: HOSPADM

## 2025-08-15 RX ORDER — POTASSIUM CHLORIDE 1500 MG/1
40 TABLET, EXTENDED RELEASE ORAL
Status: DISCONTINUED | OUTPATIENT
Start: 2025-08-15 | End: 2025-08-15 | Stop reason: HOSPADM

## 2025-08-15 RX ORDER — POTASSIUM CHLORIDE 7.45 MG/ML
10 INJECTION INTRAVENOUS
Status: COMPLETED | OUTPATIENT
Start: 2025-08-15 | End: 2025-08-15

## 2025-08-15 RX ORDER — POTASSIUM CHLORIDE 1500 MG/1
40 TABLET, EXTENDED RELEASE ORAL
Status: COMPLETED | OUTPATIENT
Start: 2025-08-15 | End: 2025-08-15

## 2025-08-15 RX ORDER — HYDRALAZINE HYDROCHLORIDE 20 MG/ML
10 INJECTION INTRAMUSCULAR; INTRAVENOUS ONCE
Status: COMPLETED | OUTPATIENT
Start: 2025-08-15 | End: 2025-08-15

## 2025-08-15 RX ORDER — SODIUM CHLORIDE 9 MG/ML
150 INJECTION, SOLUTION INTRAVENOUS CONTINUOUS
Status: ACTIVE | OUTPATIENT
Start: 2025-08-15 | End: 2025-08-15

## 2025-08-15 RX ORDER — SODIUM CHLORIDE 9 MG/ML
40 INJECTION, SOLUTION INTRAVENOUS AS NEEDED
Status: DISCONTINUED | OUTPATIENT
Start: 2025-08-15 | End: 2025-08-15 | Stop reason: HOSPADM

## 2025-08-15 RX ORDER — POTASSIUM CHLORIDE 7.45 MG/ML
10 INJECTION INTRAVENOUS
Status: DISCONTINUED | OUTPATIENT
Start: 2025-08-15 | End: 2025-08-15 | Stop reason: HOSPADM

## 2025-08-15 RX ADMIN — POTASSIUM CHLORIDE 10 MEQ: 7.46 INJECTION, SOLUTION INTRAVENOUS at 13:57

## 2025-08-15 RX ADMIN — POTASSIUM CHLORIDE 10 MEQ: 7.46 INJECTION, SOLUTION INTRAVENOUS at 12:46

## 2025-08-15 RX ADMIN — POTASSIUM CHLORIDE 40 MEQ: 1500 TABLET, EXTENDED RELEASE ORAL at 12:46

## 2025-08-15 RX ADMIN — Medication 10 ML: at 13:30

## 2025-08-15 RX ADMIN — HYDRALAZINE HYDROCHLORIDE 10 MG: 20 INJECTION INTRAMUSCULAR; INTRAVENOUS at 17:00

## 2025-08-15 RX ADMIN — SODIUM CHLORIDE 150 ML/HR: 9 INJECTION, SOLUTION INTRAVENOUS at 12:47

## 2025-08-15 RX ADMIN — POTASSIUM CHLORIDE 40 MEQ: 1500 TABLET, EXTENDED RELEASE ORAL at 21:04

## 2025-08-15 RX ADMIN — POTASSIUM CHLORIDE 40 MEQ: 1500 TABLET, EXTENDED RELEASE ORAL at 15:08

## 2025-08-15 RX ADMIN — HYDRALAZINE HYDROCHLORIDE 10 MG: 20 INJECTION INTRAMUSCULAR; INTRAVENOUS at 15:09

## 2025-08-18 ENCOUNTER — READMISSION MANAGEMENT (OUTPATIENT)
Dept: CALL CENTER | Facility: HOSPITAL | Age: 35
End: 2025-08-18
Payer: COMMERCIAL

## 2025-08-18 ENCOUNTER — TRANSITIONAL CARE MANAGEMENT TELEPHONE ENCOUNTER (OUTPATIENT)
Dept: CALL CENTER | Facility: HOSPITAL | Age: 35
End: 2025-08-18
Payer: COMMERCIAL

## 2025-08-20 ENCOUNTER — OFFICE VISIT (OUTPATIENT)
Dept: INTERNAL MEDICINE | Age: 35
End: 2025-08-20
Payer: COMMERCIAL

## 2025-08-20 ENCOUNTER — LAB (OUTPATIENT)
Dept: INTERNAL MEDICINE | Age: 35
End: 2025-08-20
Payer: COMMERCIAL

## 2025-08-20 VITALS
OXYGEN SATURATION: 99 % | SYSTOLIC BLOOD PRESSURE: 170 MMHG | HEIGHT: 76 IN | BODY MASS INDEX: 38.36 KG/M2 | DIASTOLIC BLOOD PRESSURE: 78 MMHG | WEIGHT: 315 LBS | HEART RATE: 78 BPM | TEMPERATURE: 98 F

## 2025-08-20 DIAGNOSIS — E87.6 HYPOKALEMIA: ICD-10-CM

## 2025-08-20 DIAGNOSIS — R73.09 HEMOGLOBIN A1C LESS THAN 7.0%: ICD-10-CM

## 2025-08-20 DIAGNOSIS — E11.9 TYPE 2 DIABETES MELLITUS WITHOUT COMPLICATION, WITHOUT LONG-TERM CURRENT USE OF INSULIN: ICD-10-CM

## 2025-08-20 DIAGNOSIS — E87.6 HYPOKALEMIA: Primary | ICD-10-CM

## 2025-08-20 DIAGNOSIS — I10 ESSENTIAL HYPERTENSION: ICD-10-CM

## 2025-08-20 LAB
ALBUMIN UR-MCNC: 1.7 MG/DL
ANION GAP SERPL CALCULATED.3IONS-SCNC: 12.1 MMOL/L (ref 5–15)
BUN SERPL-MCNC: 9 MG/DL (ref 6–20)
BUN/CREAT SERPL: 13.2 (ref 7–25)
CALCIUM SPEC-SCNC: 8.8 MG/DL (ref 8.6–10.5)
CHLORIDE SERPL-SCNC: 100 MMOL/L (ref 98–107)
CHOLEST SERPL-MCNC: 165 MG/DL (ref 0–200)
CO2 SERPL-SCNC: 27.9 MMOL/L (ref 22–29)
CREAT SERPL-MCNC: 0.68 MG/DL (ref 0.76–1.27)
CREAT UR-MCNC: 120.8 MG/DL
EGFRCR SERPLBLD CKD-EPI 2021: 123.5 ML/MIN/1.73
GLUCOSE SERPL-MCNC: 131 MG/DL (ref 65–99)
HDLC SERPL-MCNC: 39 MG/DL (ref 40–60)
LDLC SERPL CALC-MCNC: 108 MG/DL (ref 0–100)
LDLC/HDLC SERPL: 2.75 {RATIO}
MICROALBUMIN/CREAT UR: 14.1 MG/G (ref 0–29)
POTASSIUM SERPL-SCNC: 2.8 MMOL/L (ref 3.5–5.2)
SODIUM SERPL-SCNC: 140 MMOL/L (ref 136–145)
TRIGL SERPL-MCNC: 94 MG/DL (ref 0–150)
VLDLC SERPL-MCNC: 18 MG/DL (ref 5–40)

## 2025-08-20 PROCEDURE — 82570 ASSAY OF URINE CREATININE: CPT | Performed by: NURSE PRACTITIONER

## 2025-08-20 PROCEDURE — 80048 BASIC METABOLIC PNL TOTAL CA: CPT | Performed by: NURSE PRACTITIONER

## 2025-08-20 PROCEDURE — 36415 COLL VENOUS BLD VENIPUNCTURE: CPT | Performed by: NURSE PRACTITIONER

## 2025-08-20 PROCEDURE — 82043 UR ALBUMIN QUANTITATIVE: CPT | Performed by: NURSE PRACTITIONER

## 2025-08-20 PROCEDURE — 80061 LIPID PANEL: CPT | Performed by: NURSE PRACTITIONER

## 2025-08-20 RX ORDER — SEMAGLUTIDE 1.34 MG/ML
1 INJECTION, SOLUTION SUBCUTANEOUS WEEKLY
Qty: 3 ML | Refills: 5 | Status: SHIPPED | OUTPATIENT
Start: 2025-08-20

## 2025-08-20 RX ORDER — POTASSIUM CHLORIDE 1500 MG/1
20 TABLET, EXTENDED RELEASE ORAL 2 TIMES DAILY
Qty: 28 TABLET | Refills: 0 | Status: SHIPPED | OUTPATIENT
Start: 2025-08-20 | End: 2025-09-03

## 2025-08-22 DIAGNOSIS — E78.5 DYSLIPIDEMIA: ICD-10-CM

## 2025-08-22 RX ORDER — ATORVASTATIN CALCIUM 10 MG/1
10 TABLET, FILM COATED ORAL NIGHTLY
Qty: 30 TABLET | Refills: 5 | Status: SHIPPED | OUTPATIENT
Start: 2025-08-22

## 2025-08-27 DIAGNOSIS — E78.5 DYSLIPIDEMIA: ICD-10-CM

## 2025-08-27 RX ORDER — ATORVASTATIN CALCIUM 40 MG/1
40 TABLET, FILM COATED ORAL NIGHTLY
Qty: 30 TABLET | Refills: 5 | Status: SHIPPED | OUTPATIENT
Start: 2025-08-27